# Patient Record
Sex: MALE | Race: BLACK OR AFRICAN AMERICAN | NOT HISPANIC OR LATINO | Employment: FULL TIME | RURAL
[De-identification: names, ages, dates, MRNs, and addresses within clinical notes are randomized per-mention and may not be internally consistent; named-entity substitution may affect disease eponyms.]

---

## 2017-03-02 RX ORDER — LISINOPRIL 40 MG/1
40 TABLET ORAL DAILY
Qty: 30 TABLET | Refills: 11 | Status: SHIPPED | OUTPATIENT
Start: 2017-03-02 | End: 2018-03-27 | Stop reason: SDUPTHER

## 2017-03-06 ENCOUNTER — OFFICE VISIT (OUTPATIENT)
Dept: FAMILY MEDICINE CLINIC | Facility: CLINIC | Age: 57
End: 2017-03-06

## 2017-03-06 VITALS
HEIGHT: 66 IN | SYSTOLIC BLOOD PRESSURE: 122 MMHG | WEIGHT: 177.8 LBS | OXYGEN SATURATION: 99 % | DIASTOLIC BLOOD PRESSURE: 86 MMHG | BODY MASS INDEX: 28.57 KG/M2 | TEMPERATURE: 97.2 F | HEART RATE: 89 BPM

## 2017-03-06 DIAGNOSIS — M25.511 RIGHT SHOULDER PAIN, UNSPECIFIED CHRONICITY: Primary | ICD-10-CM

## 2017-03-06 DIAGNOSIS — E78.5 HYPERLIPIDEMIA, UNSPECIFIED HYPERLIPIDEMIA TYPE: ICD-10-CM

## 2017-03-06 DIAGNOSIS — I10 ESSENTIAL HYPERTENSION: ICD-10-CM

## 2017-03-06 PROCEDURE — 99213 OFFICE O/P EST LOW 20 MIN: CPT | Performed by: FAMILY MEDICINE

## 2017-03-06 NOTE — PROGRESS NOTES
Subjective   Amos Miranda is a 57 y.o. male.     Problem List  1. Essential Hypertension  2. Hyperlipidemia  3. Erectile Dysfunction  4. Right shoulder pain    Pt is 56 yo AAM with the above medical issues. States he is doing well except for right shoulder pain for past month. States it hurts to ambulate shoulder at times or lift right arm above head. Mainly a dull aching pain.Does often lift heavy objects at work  Denies any trauma.  Pain is about 4/ 10 on severity.  Has not tried OTC medication or PT/OT yet.    Pt also is due for labwork.  Currently on aspirin and lipitor for hyperlipidemia. Needs recheck on lipid panel. Is tolerating medication well.  BP at goal today.  Declines influenza vaccination today     Shoulder Injury    The incident occurred at work. The right shoulder is affected. The incident occurred more than 1 week ago. There was no injury mechanism. The quality of the pain is described as aching. The pain does not radiate. The pain is at a severity of 4/10. The pain is mild. Associated symptoms include numbness and tingling. Pertinent negatives include no chest pain or muscle weakness. The symptoms are aggravated by movement, overhead lifting and palpation. He has tried nothing for the symptoms. The treatment provided no relief.        The following portions of the patient's history were reviewed and updated as appropriate: allergies, current medications, past family history, past medical history, past social history, past surgical history and problem list.    Review of Systems   Constitutional: Negative.    HENT: Negative.    Eyes: Negative.    Respiratory: Negative.    Cardiovascular: Negative for chest pain.   Gastrointestinal: Negative.    Endocrine: Negative.    Genitourinary: Negative.    Musculoskeletal: Positive for arthralgias.        Right shoulder pain    Skin: Negative.    Allergic/Immunologic: Negative.    Neurological: Positive for tingling and numbness.   Hematological:  "Negative.    Psychiatric/Behavioral: Negative.        Objective    Visit Vitals   • /86 (BP Location: Right arm, Patient Position: Sitting, Cuff Size: Adult)   • Pulse 89   • Temp 97.2 °F (36.2 °C)   • Ht 66\" (167.6 cm)   • Wt 177 lb 12.8 oz (80.6 kg)   • SpO2 99%   • BMI 28.7 kg/m2           Chemistry        Component Value Date/Time     09/23/2016 1023    K 4.3 09/23/2016 1023     09/23/2016 1023    CO2 30 09/23/2016 1023    BUN 19 09/23/2016 1023    CREATININE 1.3 09/23/2016 1023        Component Value Date/Time    CALCIUM 8.9 09/23/2016 1023    ALKPHOS 51 09/23/2016 1023    AST 34 09/23/2016 1023    ALT 39 09/23/2016 1023    BILITOT 0.7 09/23/2016 1023        Lab Results   Component Value Date    WBC 8.8 09/23/2016    HGB 13.6 (L) 09/23/2016    HCT 38.8 (L) 09/23/2016    MCV 86.2 09/23/2016     09/23/2016     No results found for: CHOL  Lab Results   Component Value Date    TRIG 66 02/23/2016     Lab Results   Component Value Date    HDL 49 (L) 02/23/2016     Lab Results   Component Value Date    LDLCALC 73 02/23/2016     No results found for: LDL  No results found for: HDLLDLRATIO  No components found for: CHOLHDL  Lab Results   Component Value Date    HGBA1C 5.5 09/23/2016     No results found for: TSH, R8XYQKY, K0VSUUA, THYROIDAB  Physical Exam   Constitutional: He is oriented to person, place, and time. He appears well-developed and well-nourished. No distress.   HENT:   Head: Normocephalic and atraumatic.   Right Ear: External ear normal.   Left Ear: External ear normal.   Eyes: Conjunctivae and EOM are normal. Pupils are equal, round, and reactive to light. Right eye exhibits no discharge. Left eye exhibits no discharge. No scleral icterus.   Neck: Normal range of motion. Neck supple. No JVD present. No tracheal deviation present. No thyromegaly present.   Cardiovascular: Normal rate, regular rhythm and normal heart sounds.    Pulmonary/Chest: Effort normal and breath sounds " normal. No stridor. No respiratory distress. He has no wheezes.   Abdominal: Soft. Bowel sounds are normal. He exhibits no distension and no mass. There is no tenderness. There is no rebound and no guarding. No hernia.   Musculoskeletal: He exhibits tenderness. He exhibits no edema or deformity.        Right shoulder: He exhibits decreased range of motion, tenderness, bony tenderness, pain and spasm.        Arms:  Lymphadenopathy:     He has no cervical adenopathy.   Neurological: He is alert and oriented to person, place, and time. He has normal reflexes. No cranial nerve deficit. Coordination normal.   Skin: Skin is warm and dry. No rash noted. He is not diaphoretic. No erythema. No pallor.   Psychiatric: He has a normal mood and affect. His behavior is normal. Judgment and thought content normal.   Nursing note and vitals reviewed.      Assessment/Plan   Problems Addressed this Visit        Cardiovascular and Mediastinum    Hyperlipidemia    Relevant Orders    CBC Auto Differential    Comprehensive Metabolic Panel    Lipid Panel    Essential hypertension       Nervous and Auditory    Right shoulder pain - Primary    Relevant Orders    XR Shoulder 2+ View Right    Ambulatory Referral to Physical Therapy      -hypertension - BP at goal today continue with lisinopril and HCTZ  -hyperlpidemia - recheck lipid panel. Continue with statin  -Right shoulder pain - will get x-ray of shoulder.  Continue with flexeril that was previously prescribed. Will try Voltaren gel 1% to be applied TID. Will refer to PT/OT.  Services appreciated. If not getting better consider MRI  - recheck in 1 month

## 2017-03-06 NOTE — PATIENT INSTRUCTIONS
Diclofenac skin gel  What is this medicine?  DICLOFENAC (dye NICANOR rodgers ak) is a non-steroidal anti-inflammatory drug (NSAID). The 1% skin gel is used to treat osteoarthritis of the hands or knees. The 3% skin gel is used to treat actinic keratosis.  This medicine may be used for other purposes; ask your health care provider or pharmacist if you have questions.  COMMON BRAND NAME(S): DSG Evans, Solaraze, Voltaren Gel  What should I tell my health care provider before I take this medicine?  They need to know if you have any of these conditions:  -asthma  -bleeding problems  -coronary artery bypass graft (CABG) surgery within the past 2 weeks  -heart disease  -high blood pressure  -if you frequently drink alcohol containing drinks  -kidney disease  -liver disease  -open or infected skin  -stomach problems  -an unusual or allergic reaction to diclofenac, aspirin, other NSAIDs, other medicines, benzyl alcohol (3% gel only), foods, dyes, or preservatives  -pregnant or trying to get pregnant  -breast-feeding  How should I use this medicine?  This medicine is for external use only. Follow the directions on the prescription label. Wash hands before and after use. Do not get this medicine in your eyes. If you do, rinse out with plenty of cool tap water. Use your doses at regular intervals. Do not use your medicine more often than directed.  A special MedGuide will be given to you by the pharmacist with each prescription and refill of the 1% gel. Be sure to read this information carefully each time.  Talk to your pediatrician regarding the use of this medicine in children. Special care may be needed. The 3% gel is not approved for use in children.  Overdosage: If you think you have taken too much of this medicine contact a poison control center or emergency room at once.  NOTE: This medicine is only for you. Do not share this medicine with others.  What if I miss a dose?  If you miss a dose, use it as soon as you can. If it is  almost time for your next dose, use only that dose. Do not use double or extra doses.  What may interact with this medicine?  -aspirin  -NSAIDs, medicines for pain and inflammation, like ibuprofen or naproxen  Do not use any other skin products without telling your doctor or health care professional.  This list may not describe all possible interactions. Give your health care provider a list of all the medicines, herbs, non-prescription drugs, or dietary supplements you use. Also tell them if you smoke, drink alcohol, or use illegal drugs. Some items may interact with your medicine.  What should I watch for while using this medicine?  Tell your doctor or healthcare professional if your symptoms do not start to get better or if they get worse. You will need to follow up with your health care provider to monitor your progress. You may need to be treated for up to 3 months if you are using the 3% gel, but the full effect may not occur until 1 month after stopping treatment. If you develop a severe skin reaction, contact your doctor or health care professional immediately.  This medicine can make you more sensitive to the sun. Keep out of the sun. If you cannot avoid being in the sun, wear protective clothing and use sunscreen. Do not use sun lamps or tanning beds/booths.  Do not take medicines such as ibuprofen and naproxen with this medicine. Side effects such as stomach upset, nausea, or ulcers may be more likely to occur. Many medicines available without a prescription should not be taken with this medicine.  This medicine does not prevent heart attack or stroke. In fact, this medicine may increase the chance of a heart attack or stroke. The chance may increase with longer use of this medicine and in people who have heart disease. If you take aspirin to prevent heart attack or stroke, talk with your doctor or health care professional.  This medicine can cause ulcers and bleeding in the stomach and intestines at any  time during treatment. Do not smoke cigarettes or drink alcohol. These increase irritation to your stomach and can make it more susceptible to damage from this medicine. Ulcers and bleeding can happen without warning symptoms and can cause death.  You may get drowsy or dizzy. Do not drive, use machinery, or do anything that needs mental alertness until you know how this medicine affects you. Do not stand or sit up quickly, especially if you are an older patient. This reduces the risk of dizzy or fainting spells.  This medicine can cause you to bleed more easily. Try to avoid damage to your teeth and gums when you brush or floss your teeth.  What side effects may I notice from receiving this medicine?  Side effects that you should report to your doctor or health care professional as soon as possible:  -allergic reactions like skin rash, itching or hives, swelling of the face, lips, or tongue  -black or bloody stools, blood in the urine or vomit  -blurred vision  -chest pain  -difficulty breathing or wheezing  -nausea or vomiting  -redness, blistering, peeling or loosening of the skin, including inside the mouth  -slurred speech or weakness on one side of the body  -trouble passing urine or change in the amount of urine  -unexplained weight gain or swelling  -unusually weak or tired  -yellowing of eyes or skin  Side effects that usually do not require medical attention (report to your doctor or health care professional if they continue or are bothersome):  -dizziness  -dry skin  -headache  -heartburn  -increased sensitivity to the sun  -stomach pain  -tingling at the application site  This list may not describe all possible side effects. Call your doctor for medical advice about side effects. You may report side effects to FDA at 1-081-FDA-6779.  Where should I keep my medicine?  Keep out of the reach of children.  Store the 1% gel at room temperature between 15 and 30 degrees C (59 and 86 degrees F). Store the 3% gel  at room temperature between 20 and 25 degrees C (68 and 77 degrees F). Protect from light. Throw away any unused medicine after the expiration date.  NOTE: This sheet is a summary. It may not cover all possible information. If you have questions about this medicine, talk to your doctor, pharmacist, or health care provider.     © 2016, [employer]. (2009-04-20 16:35:07)

## 2017-03-23 ENCOUNTER — HOSPITAL ENCOUNTER (OUTPATIENT)
Dept: PHYSICAL THERAPY | Facility: HOSPITAL | Age: 57
Setting detail: THERAPIES SERIES
Discharge: HOME | End: 2017-03-23

## 2017-03-23 DIAGNOSIS — M25.511 RIGHT SHOULDER PAIN, UNSPECIFIED CHRONICITY: Primary | ICD-10-CM

## 2017-03-23 PROCEDURE — 97162 PT EVAL MOD COMPLEX 30 MIN: CPT | Performed by: PHYSICAL THERAPIST

## 2017-03-23 PROCEDURE — 97110 THERAPEUTIC EXERCISES: CPT | Performed by: PHYSICAL THERAPIST

## 2017-03-23 NOTE — PROGRESS NOTES
Outpatient Physical Therapy Ortho Initial Evaluation  [facility]  Lakeisha Kuhn, PT, DPT, CSCS       Patient Name: Amos Miranda  : 1960  MRN: [id number]  Today's Date: 3/23/2017      Visit Date: 2017     Pt reports 1/10 pain.  Reports N/A% of improvement.  Attended  visits.  Insurance available: 20 visits per calendar year  Next MD appt: [id number]  Recertification: 2017.    Patient Active Problem List   Diagnosis   • Need for hepatitis C screening test   • Hyperlipidemia   • Essential hypertension   • Low back pain without sciatica   • Right shoulder pain        Past Medical History:   Diagnosis Date   • Dyslipidemia    • Dysuria    • Encounter for screening for diabetes mellitus    • Encounter for screening for lipoid disorders    • Encounter for screening for malignant neoplasm of colon    • Essential hypertension    • Generalized aches and pains    • Headache    • Lateral epicondylitis     R>L   • Male erectile dysfunction, unspecifed    • Migraine    • Nocturia         History reviewed. No pertinent surgical history.     Number of days off work: None    Patient is     Patient has grown children.    Medications:  Lisinipril  Hydrochlorothiazide  Atrovastatin  Cialis  Flexaril    Allergies: See EMR    Visit Dx:     ICD-10-CM ICD-9-CM   1. Right shoulder pain, unspecified chronicity M25.511 719.41             Patient History       17 1600          History    Chief Complaint Pain  -AJ      Type of Pain Shoulder pain  -AJ      Date Current Problem(s) Began --   Few month onset of shoulder pain.  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 Reports on the weekends it does pretty good. DUring the week it is mainly a deep ache. Denies and numbness or tingling.  -AJ      Patient/Caregiver Goals Relieve pain  -AJ      Current Tobacco Use None.   "-AJ      Smoking Status Non-smoker  -AJ      Patient's Rating of General Health Good  -AJ      Hand Dominance right-handed  -AJ      Occupation/sports/leisure activities Occupation: [employer], Dye changer; Hobbies: sports, gardening, lawn care  -AJ      Patient seeing anyone else for problem(s)? No  -AJ      What clinical tests have you had for this problem? X-ray  -AJ      Results of Clinical Tests Calcific tendinitis or bursitis, report in chart.  -AJ      History of Previous Related Injuries None.  -AJ      Pain     Pain Location Shoulder  -AJ      Pain at Present 1  -AJ      Pain at Best 0  -AJ      Pain at Worst 4  -AJ      Pain Frequency Intermittent;Several days a week  -AJ      Pain Description Aching  -AJ      What Performance Factors Make the Current Problem(s) WORSE? Lifting right in front of the body.  -AJ      What Performance Factors Make the Current Problem(s) BETTER? Rest  -AJ      Is your sleep disturbed? No  -AJ      What position do you sleep in? Right sidelying;Left sidelying  -AJ      Difficulties at work? Lifting dye casts.  -AJ      Difficulties with ADL's? None.  -AJ      Difficulties with recreational activities? Gardening, sports  -AJ        User Key  (r) = Recorded By, (t) = Taken By, (c) = Cosigned By    Initials Name Provider Type    AJ Lakeisha Kuhn, PT Physical Therapist                PT Ortho       03/23/17 1600    Subjective Comments    Subjective Comments Patient wishes to relieve pain completely and find out why it hurts.  -AJ    Subjective Pain    Able to rate subjective pain? yes  -AJ    Pre-Treatment Pain Level 1  -AJ    Post-Treatment Pain Level 0   \"Numb\"  -AJ    Posture/Observations    Posture- WNL Posture is WNL  -AJ    Alignment Options Rounded shoulders  -AJ    Rounded Shoulders Bilateral:;Mild;Increased  -AJ    Posture/Observations Comments No acute distress, good postural awareness.  -AJ    Sensation    Sensation WNL? WNL  -AJ    Light Touch No apparent deficits  -AJ "    Sharp/Dull No apparent deficits  -AJ    Additional Comments Denies and numbness or tingling  -AJ    Special Tests/Palpation    Special Tests/Palpation --   No areas of TTP.  -AJ    Shoulder Impingement/Rotator Cuff Special Tests    Bazzi-Timothy Test (RC Lesion vs. Bursitis) Bilateral:;Negative  -AJ    Neer Impingement Test (RC Lesion vs. Bursitis) Bilateral:;Negative  -AJ    Full Can Test (RC Lesion) Bilateral:;Negative  -AJ    Empty Can Test (RC Lesion) Bilateral:;Negative   painful on R, but negative test.  -AJ    Drop Arm Test (Full Thickness RC Lesion) Bilateral:;Negative  -AJ    Internal Impingement Sign Bilateral:;Negative  -AJ    Lift-Off Test (Subscapularis Lesion) Bilateral:;Negative  -AJ    Belly Press (Subscapularis Lesion) Bilateral:;Negative  -AJ    Speed's Test (LH of Biceps Lesion) Bilateral:;Negative  -AJ    Yergason Test (LH of Biceps Lesion) Bilateral:;Negative  -AJ    Shoulder Laxity/Instability Special Tests    Load and Shift Test Bilateral:;Negative  -AJ    Sulcus Sign, 0 Degrees Bilateral:;Negative  -AJ    Horizontal Adduction Test (AC Joint Pain) Bilateral:;Negative   (Negative AC squeeze B)  -AJ    Biceps/Labral Special Tests    Brandon's Test (Labral Test) Bilateral:;Negative  -AJ    Speed's Test (Biceps Tendinopathy vs. Labral Tear) Bilateral:;Negative  -AJ    Yergason's Test (Biceps Tendinopathy vs. Labral Tear) Bilateral:;Negative  -AJ    Left Shoulder    Flexion AROM --   155°  -AJ    ABduction AROM --   156°  -AJ    External Rotation AROM --   87° @90° of shoulder abduction  -AJ    Internal Rotation AROM --   85° @90° of shoulder abduction  -AJ    Right Shoulder    Flexion AROM --   155°  -AJ    ABduction AROM --   157°  -AJ    External Rotation AROM --   90° @90° of shoulder abduction  -AJ    Internal Rotation AROM --   62° @90° of shoulder abduction  -AJ    Left Shoulder    Flexion Gross Movement (5/5) normal  -AJ    ABduction Gross Movement (5/5) normal  -AJ    Abduction  Supraspinatus (4+/5) good plus  -AJ    Int Rotation Gross Movement (5/5) normal  -AJ    Ext Rotation Gross Movement (5/5) normal  -AJ    External Rotation Infraspinatus (4+/5) good plus  -AJ    Right Shoulder    Flexion Gross Movement (5/5) normal  -AJ    ABduction Gross Movement (5/5) normal  -AJ    Abduction Supraspinatus (4+/5) good plus  -AJ    Int Rotation Gross Movement (5/5) normal  -AJ    Ext Rotation Gross Movement (5/5) normal  -AJ    External Rotation Infraspinatus (4/5) good  -AJ    Flexibility    Flexibility Tested? --   Mild Pec tightness B  -AJ    Transfers    Transfer, Comment I with all transfers  -      User Key  (r) = Recorded By, (t) = Taken By, (c) = Cosigned By    Initials Name Provider Type    RADHA Kuhn, PT Physical Therapist                Therapy Education       03/23/17 1600          Therapy Education    Given HEP;Pain management;Posture/body mechanics  -      Program New  -AJ      How Provided Verbal;Demonstration;Written  -AJ      Provided to Patient  -      Level of Understanding Verbalized  -        User Key  (r) = Recorded By, (t) = Taken By, (c) = Cosigned By    Initials Name Provider Type    RADHA Kuhn, PT Physical Therapist                PT OP Goals       03/23/17 1600       PT Short Term Goals    STG Date to Achieve 04/13/17  -     STG 1 I with HEP and have additions/changes by next recertification.  -     STG 2 AROM R shoulder IR to 70° at 90° of shoulder abduction.  -     STG 3 Infraspinatus strength 4+/5 or greater on R.  -     STG 4 patient to be more aware of posture and posture correction technique.  -     STG 5 R supraspinatus strength 5/5.  -     Long Term Goals    LTG Date to Achieve 04/20/17  -     LTG 1 AROM R shoulder IR 80° or greater at 90° of shoulder abduction.  -     LTG 2 B UE 5/5 in all directions.  -     LTG 3 Able to show proper lifitng technique floor to waist with 40# x5 proper technique, work simulation  no increrase in pain.  -     LTG 4 I with final HEP.  -     LTG 5 D/C with free 30 day fitness formula membership.  -     Time Calculation    PT Goal Re-Cert Due Date 04/13/17  -       User Key  (r) = Recorded By, (t) = Taken By, (c) = Cosigned By    Initials Name Provider Type    RADHA Kuhn, PT Physical Therapist        Barriers to Rehab: Include significant or possible significant arthritic or degenerative changes that have occurred within the joint.      Safety Issues: None noted.          PT Assessment/Plan       03/23/17 1600       PT Assessment    Functional Limitations Limitation in home management;Limitations in community activities;Performance in leisure activities;Performance in work activities  -     Impairments Impaired flexibility;Impaired muscle endurance;Impaired muscle length;Impaired muscle power;Motor function;Muscle strength;Pain;Posture;Range of motion  -     Assessment Comments patient did well with all ther ex and given written copies of HEP exercises.  -     Rehab Potential Excellent  -     Patient/caregiver participated in establishment of treatment plan and goals Yes  -     Patient would benefit from skilled therapy intervention Yes  -     PT Plan    PT Frequency 2x/week  -     Predicted Duration of Therapy Intervention (days/wks) 3-4 weeks, 6-8 visits.  -     Planned CPT's? PT EVAL MOD COMPLELITY: 08535;PT THER PROC EA 15 MIN: 62948;PT THER ACT EA 15 MIN: 12757;PT MANUAL THERAPY EA 15 MIN: 86702;PT NEUROMUSC RE-EDUCATION EA 15 MIN: 56441;PT ELECTRICAL STIM UNATTEND: ;PT THER SUPP EA 15 MIN  -     Physical Therapy Interventions (Optional Details) dry needling;gross motor skills;home exercise program;joint mobilization;manual therapy techniques;modalities;neuromuscular re-education;postural re-education;ROM (Range of Motion);strengthening;stretching  -     PT Plan Comments RTC strength and scapular stabalization  -       User Key  (r) = Recorded  "By, (t) = Taken By, (c) = Cosigned By    Initials Name Provider Type    RADHA Kuhn, KYLAH Physical Therapist        Other therapeutic activities and/or exercises will be prescribed depending on the patients progress or lack there of.          Modalities       03/23/17 1600          Ice    Ice Applied Yes  -AJ      Location R shoulder  -AJ      Rx Minutes 15 mins  -AJ      Ice S/P Rx Yes  -AJ        User Key  (r) = Recorded By, (t) = Taken By, (c) = Cosigned By    Initials Name Provider Type    RADHA Kuhn PT Physical Therapist              Exercises       03/23/17 1600          Subjective Comments    Subjective Comments Patient wishes to relieve pain completely and find out why it hurts.  -AJ      Subjective Pain    Able to rate subjective pain? yes  -AJ      Pre-Treatment Pain Level 1  -AJ      Post-Treatment Pain Level 0   \"Numb\"  -AJ      Exercise 1    Exercise Name 1 Pro II UE F/R  -AJ      Resistance 1 --   Level 4.0  -AJ      Time (Minutes) 1 10  -AJ      Exercise 2    Exercise Name 2 Doorway S  -AJ      Sets 2 2  -AJ      Time (Seconds) 2 30  -AJ      Exercise 3    Exercise Name 3 Full cans 3-way, >90°  -AJ      Reps 3 10   each  -AJ      Exercise 4    Exercise Name 4 T-band No money  -AJ      Equipment 4 Theraband  -AJ      Resistance 4 Red  -AJ      Reps 4 20  -AJ      Time (Seconds) 4 5\" hold  -AJ      Exercise 5    Exercise Name 5 Chest/Bicep S  -AJ      Reps 5 2  -AJ      Time (Seconds) 5 30  -AJ        User Key  (r) = Recorded By, (t) = Taken By, (c) = Cosigned By    Initials Name Provider Type    RADHA Kuhn PT Physical Therapist                              Outcome Measures       03/23/17 1600          Quick DASH    Open a tight or new jar. 1  -AJ      Do heavy household chores (e.g., wash walls, wash floors) 1  -AJ      Carry a shopping bag or briefcase 1  -AJ      Wash your back 1  -AJ      Use a knife to cut food 1  -AJ      Recreational activities in which you " take some force or impact through your arm, should or hand (e.g. golf, hammering, tennis, etc.) 1  -AJ      During the past week, to what extent has your arm, shoulder, or hand problem interfered with your normal social activites with family, friends, neighbors or groups? 2  -AJ      During the past week, were you limited in your work or other regular daily activities as a result of your arm, shoulder or hand problem? 1  -AJ      Arm, Shoulder, or hand pain 2  -AJ      Tingling (pins and needles) in your arm, shoulder, or hand 1  -AJ      During the past week, how much difficulty have you had sleeping because of the pain in your arm, shoulder or hand? 1  -AJ      Number of Questions Answered 11  -AJ      Quick DASH Score 4.55  -AJ      Functional Assessment    Outcome Measure Options Quick DASH  -AJ        User Key  (r) = Recorded By, (t) = Taken By, (c) = Cosigned By    Initials Name Provider Type    RADHA Kuhn PT Physical Therapist            Time Calculation:   Start Time: 1600  Stop Time: 1705  Time Calculation (min): 65 min  PT Non-Billable Time (min): 20 min  Total Timed Code Minutes- PT: 25 minute(s)     Therapy Charges for Today     Code Description Service Date Service Provider Modifiers Qty    09239960312 HC PT EVAL MOD COMPLEXITY 2 3/23/2017 Lakeisha Kuhn, PT GP 1    18551594082 HC PT THER PROC EA 15 MIN 3/23/2017 Lakeisha Kuhn PT GP 2    05056565372 HC PT THER SUPP EA 15 MIN 3/23/2017 Lakeisha Kuhn PT GP 1          PT G-Codes  Outcome Measure Options: Quick DASH         Lakeisha Kuhn PT, DPT, CSCS  3/23/2017

## 2017-04-03 ENCOUNTER — TELEPHONE (OUTPATIENT)
Dept: PHYSICAL THERAPY | Facility: HOSPITAL | Age: 57
End: 2017-04-03

## 2017-04-05 ENCOUNTER — HOSPITAL ENCOUNTER (OUTPATIENT)
Dept: PHYSICAL THERAPY | Facility: HOSPITAL | Age: 57
Setting detail: THERAPIES SERIES
Discharge: HOME | End: 2017-04-05

## 2017-04-05 DIAGNOSIS — M25.511 RIGHT SHOULDER PAIN, UNSPECIFIED CHRONICITY: Primary | ICD-10-CM

## 2017-04-05 LAB
ALBUMIN SERPL-MCNC: 4.7 G/DL (ref 3.4–4.8)
ALBUMIN/GLOB SERPL: 1.3 G/DL (ref 1.1–1.8)
ALP SERPL-CCNC: 68 U/L (ref 38–126)
ALT SERPL W P-5'-P-CCNC: 38 U/L (ref 21–72)
ANION GAP SERPL CALCULATED.3IONS-SCNC: 15 MMOL/L (ref 5–15)
ARTICHOKE IGE QN: 69 MG/DL (ref 1–129)
AST SERPL-CCNC: 29 U/L (ref 17–59)
BASOPHILS # BLD AUTO: 0.02 10*3/MM3 (ref 0–0.2)
BASOPHILS NFR BLD AUTO: 0.1 % (ref 0–2)
BILIRUB SERPL-MCNC: 1.2 MG/DL (ref 0.2–1.3)
BUN BLD-MCNC: 13 MG/DL (ref 7–21)
BUN/CREAT SERPL: 9.2 (ref 7–25)
CALCIUM SPEC-SCNC: 9.7 MG/DL (ref 8.4–10.2)
CHLORIDE SERPL-SCNC: 100 MMOL/L (ref 95–110)
CHOLEST SERPL-MCNC: 144 MG/DL (ref 0–199)
CO2 SERPL-SCNC: 28 MMOL/L (ref 22–31)
CREAT BLD-MCNC: 1.42 MG/DL (ref 0.7–1.3)
DEPRECATED RDW RBC AUTO: 45.1 FL (ref 35.1–43.9)
EOSINOPHIL # BLD AUTO: 0.02 10*3/MM3 (ref 0–0.7)
EOSINOPHIL NFR BLD AUTO: 0.1 % (ref 0–7)
ERYTHROCYTE [DISTWIDTH] IN BLOOD BY AUTOMATED COUNT: 14.2 % (ref 11.5–14.5)
GFR SERPL CREATININE-BSD FRML MDRD: 62 ML/MIN/1.73 (ref 56–130)
GLOBULIN UR ELPH-MCNC: 3.7 GM/DL (ref 2.3–3.5)
GLUCOSE BLD-MCNC: 104 MG/DL (ref 60–100)
HCT VFR BLD AUTO: 43.2 % (ref 39–49)
HDLC SERPL-MCNC: 53 MG/DL (ref 60–200)
HGB BLD-MCNC: 15.2 G/DL (ref 13.7–17.3)
IMM GRANULOCYTES # BLD: 0.08 10*3/MM3 (ref 0–0.02)
IMM GRANULOCYTES NFR BLD: 0.3 % (ref 0–0.5)
LDLC/HDLC SERPL: 1.53 {RATIO} (ref 0–3.55)
LYMPHOCYTES # BLD AUTO: 1.59 10*3/MM3 (ref 0.6–4.2)
LYMPHOCYTES NFR BLD AUTO: 6.9 % (ref 10–50)
MCH RBC QN AUTO: 30.6 PG (ref 26.5–34)
MCHC RBC AUTO-ENTMCNC: 35.2 G/DL (ref 31.5–36.3)
MCV RBC AUTO: 87.1 FL (ref 80–98)
MONOCYTES # BLD AUTO: 1.41 10*3/MM3 (ref 0–0.9)
MONOCYTES NFR BLD AUTO: 6.1 % (ref 0–12)
NEUTROPHILS # BLD AUTO: 20.09 10*3/MM3 (ref 2–8.6)
NEUTROPHILS NFR BLD AUTO: 86.5 % (ref 37–80)
PLATELET # BLD AUTO: 207 10*3/MM3 (ref 150–450)
PMV BLD AUTO: 11.2 FL (ref 8–12)
POTASSIUM BLD-SCNC: 4.5 MMOL/L (ref 3.5–5.1)
PROT SERPL-MCNC: 8.4 G/DL (ref 6.3–8.6)
RBC # BLD AUTO: 4.96 10*6/MM3 (ref 4.37–5.74)
SODIUM BLD-SCNC: 143 MMOL/L (ref 137–145)
TRIGL SERPL-MCNC: 49 MG/DL (ref 20–199)
WBC NRBC COR # BLD: 23.21 10*3/MM3 (ref 3.2–9.8)

## 2017-04-05 PROCEDURE — 97110 THERAPEUTIC EXERCISES: CPT

## 2017-04-05 PROCEDURE — 85025 COMPLETE CBC W/AUTO DIFF WBC: CPT | Performed by: FAMILY MEDICINE

## 2017-04-05 PROCEDURE — 80053 COMPREHEN METABOLIC PANEL: CPT | Performed by: FAMILY MEDICINE

## 2017-04-05 PROCEDURE — 80061 LIPID PANEL: CPT | Performed by: FAMILY MEDICINE

## 2017-04-05 NOTE — PROGRESS NOTES
Outpatient Physical Therapy Ortho Treatment Note  [facility]  Sherice Tapia PTA  17  4:15 PM       Patient Name: Amos Miranda  : 1960  MRN: [id number]  Today's Date: 2017      Visit Date: 2017  Subjective Improvement: N/A      Attendance: 2/3  Approved: 20 visits           MD follow up: 17            date: 17        Visit Dx:    ICD-10-CM ICD-9-CM   1. Right shoulder pain, unspecified chronicity M25.511 719.41       Patient Active Problem List   Diagnosis   • Need for hepatitis C screening test   • Hyperlipidemia   • Essential hypertension   • Low back pain without sciatica   • Right shoulder pain        Past Medical History:   Diagnosis Date   • Dyslipidemia    • Dysuria    • Encounter for screening for diabetes mellitus    • Encounter for screening for lipoid disorders    • Encounter for screening for malignant neoplasm of colon    • Essential hypertension    • Generalized aches and pains    • Headache    • Lateral epicondylitis     R>L   • Male erectile dysfunction, unspecifed    • Migraine    • Nocturia         No past surgical history on file.          PT Ortho       17 1500    Subjective Comments    Subjective Comments pt reports he is sore today from doing some yard work.  -ESTIVEN    Subjective Pain    Able to rate subjective pain? yes  -ESTIVEN    Pre-Treatment Pain Level 7  -ESTIVEN    Post-Treatment Pain Level 4  -ESTIVEN    Right Shoulder    Flexion AROM --   158°  -ESTIVEN    ABduction AROM --   157°  -ESTIVEN      User Key  (r) = Recorded By, (t) = Taken By, (c) = Cosigned By    Initials Name Provider Type    ESTIVEN Tapia PTA Physical Therapy Assistant                            PT Assessment/Plan       17 1500       PT Assessment    Assessment Comments slight increase in AROM FF, abd did not change. Good form with all.   -ESTIVEN     PT Plan    PT Frequency 2x/week  -ESTIVEN     Predicted Duration of Therapy Intervention (days/wks) 3-4 weeks,  "6-8 visits  -ESTIVEN     PT Plan Comments Clocks  -ESTIVEN       User Key  (r) = Recorded By, (t) = Taken By, (c) = Cosigned By    Initials Name Provider Type    ESTIVEN Tapia PTA Physical Therapy Assistant                Modalities       04/05/17 1500          Ice    Ice Applied Yes  -ESTIVEN      Location R shoulder  -ESTIVEN      Rx Minutes 15 mins  -ESTIVEN      Ice S/P Rx Yes  -ESTIVEN        User Key  (r) = Recorded By, (t) = Taken By, (c) = Cosigned By    Initials Name Provider Type    ESTIVEN Tapia PTA Physical Therapy Assistant                Exercises       04/05/17 1500          Subjective Comments    Subjective Comments pt reports he is sore today from doing some yard work.  -ESTIVEN      Subjective Pain    Able to rate subjective pain? yes  -ESTIVEN      Pre-Treatment Pain Level 7  -ESTIVEN      Post-Treatment Pain Level 4  -ESTIVEN      Exercise 1    Exercise Name 1 Pro II UE F/R  -ESTIVEN      Resistance 1 --   Level 4.0  -ESTIVEN      Time (Minutes) 1 10  -ESTIVEN      Exercise 2    Exercise Name 2 Doorway S  -ESTIVEN      Sets 2 3  -ESTIVEN      Time (Seconds) 2 30  -ESTIVEN      Exercise 3    Exercise Name 3 Full cans 3-way, >90°  -ESTIVEN      Reps 3 10   each  -ESTIVEN      Exercise 4    Exercise Name 4 T-band No money  -ESTIVEN      Equipment 4 Theraband  -ESTIVEN      Resistance 4 Red  -ESTIVEN      Reps 4 20  -ESTIVEN      Time (Seconds) 4 5\" hold  -ESTIVEN      Exercise 5    Exercise Name 5 Chest/Bicep S  -ESTIVEN      Reps 5 2  -ESTIVEN      Time (Seconds) 5 30  -ESTIVEN      Exercise 6    Exercise Name 6 Tband: Mid rows  -ESTIVEN      Equipment 6 Theraband  -ESTIVEN      Resistance 6 Red  -ESTIVEN      Sets 6 2  -ESTIVEN      Reps 6 10  -ESTIVEN      Exercise 7    Exercise Name 7 Tband: shoulder extension  -ESTIVEN      Equipment 7 Theraband  -ESTIVEN      Resistance 7 Red  -ESTIVEN      Sets 7 2  -ESTIVEN      Reps 7 10  -ESTIVEN      Exercise 8    Exercise Name 8 Wall push ups  -ESTIVEN      Sets 8 2  -ESTIVEN      Reps 8 10  -ESTIVEN      Exercise 9    Exercise Name 9 Chest pulls  -ESTIVEN      Equipment 9 Theraband  -ESTIVEN      Resistance 9 Red  -ESTIVEN      Sets 9 2  -ESTIVEN "      Reps 9 10  -ESTIVEN        User Key  (r) = Recorded By, (t) = Taken By, (c) = Cosigned By    Initials Name Provider Type    ESTIVEN Tapia PTA Physical Therapy Assistant                               PT OP Goals       04/05/17 1600       PT Short Term Goals    STG Date to Achieve 04/13/17  -ESTIVEN     STG 1 I with HEP and have additions/changes by next recertification.  -ESTIVEN     STG 1 Progress Ongoing  -ESTIVEN     STG 2 AROM R shoulder IR to 70° at 90° of shoulder abduction.  -ESTIVEN     STG 2 Progress Ongoing  -ESTIVEN     STG 3 Infraspinatus strength 4+/5 or greater on R.  -ESTIVEN     STG 3 Progress Ongoing  -ESTIVEN     STG 4 patient to be more aware of posture and posture correction technique.  -ESTIVEN     STG 4 Progress Ongoing  -ESTIVEN     STG 5 R supraspinatus strength 5/5.  -ESTIVEN     STG 5 Progress Ongoing  -ESTIVEN     Long Term Goals    LTG Date to Achieve 04/20/17  -ESTIVEN     LTG 1 AROM R shoulder IR 80° or greater at 90° of shoulder abduction.  -ESTIVEN     LTG 1 Progress Ongoing  -ESTIVEN     LTG 2 B UE 5/5 in all directions.  -ESTIVEN     LTG 2 Progress Ongoing  -ESTIVEN     LTG 3 Able to show proper lifitng technique floor to waist with 40# x5 proper technique, work simulation no increrase in pain.  -ESTIVEN     LTG 3 Progress Ongoing  -ESTIVEN     LTG 4 I with final HEP.  -ESTIVEN     LTG 4 Progress Ongoing  -ESTIVEN     LTG 5 D/C with free 30 day fitness formula membership.  -ESTIVEN     LTG 5 Progress Ongoing  -ESTIEVN     Time Calculation    PT Goal Re-Cert Due Date 04/13/17  -ESTIVEN       User Key  (r) = Recorded By, (t) = Taken By, (c) = Cosigned By    Initials Name Provider Type    ESTIVEN Tapia PTA Physical Therapy Assistant                Therapy Education       04/05/17 1600          Therapy Education    Given HEP  -ESTIVEN      Program Reinforced  -ESTIVEN      How Provided Verbal;Demonstration;Written  -ESTIVEN      Provided to Patient  -ESTIVEN      Level of Understanding Verbalized  -ESTIVEN        User Key  (r) = Recorded By, (t) = Taken By, (c) = Cosigned By    Initials Name Provider Type     ESTIVEN Tapia PTA Physical Therapy Assistant                Time Calculation:   Start Time: 1515  Stop Time: 1620  Time Calculation (min): 65 min  Total Timed Code Minutes- PT: 50 minute(s)    Therapy Charges for Today     Code Description Service Date Service Provider Modifiers Qty    94162946030 HC PT THER PROC EA 15 MIN 4/5/2017 Sherice Tapia PTA GP 3    08907683585 HC PT THER SUPP EA 15 MIN 4/5/2017 Sherice Tapia PTA GP 1                    Sherice Tapia PTA  4/5/2017

## 2017-04-06 ENCOUNTER — HOSPITAL ENCOUNTER (OUTPATIENT)
Dept: PHYSICAL THERAPY | Facility: HOSPITAL | Age: 57
Setting detail: THERAPIES SERIES
Discharge: HOME | End: 2017-04-06

## 2017-04-06 DIAGNOSIS — M25.511 RIGHT SHOULDER PAIN, UNSPECIFIED CHRONICITY: Primary | ICD-10-CM

## 2017-04-06 PROCEDURE — 97110 THERAPEUTIC EXERCISES: CPT | Performed by: PHYSICAL THERAPY ASSISTANT

## 2017-04-06 NOTE — PROGRESS NOTES
Outpatient Physical Therapy Ortho Treatment Note  [facility]     Patient Name: Amos Miranda  : 1960  MRN: [id number]  Today's Date: 2017      Visit Date: 2017    Visits 3/4   Recert Date 17   MD appointment    Pt reports  0% improvement       Visit Dx:    ICD-10-CM ICD-9-CM   1. Right shoulder pain, unspecified chronicity M25.511 719.41       Patient Active Problem List   Diagnosis   • Need for hepatitis C screening test   • Hyperlipidemia   • Essential hypertension   • Low back pain without sciatica   • Right shoulder pain        Past Medical History:   Diagnosis Date   • Dyslipidemia    • Dysuria    • Encounter for screening for diabetes mellitus    • Encounter for screening for lipoid disorders    • Encounter for screening for malignant neoplasm of colon    • Essential hypertension    • Generalized aches and pains    • Headache    • Lateral epicondylitis     R>L   • Male erectile dysfunction, unspecifed    • Migraine    • Nocturia         No past surgical history on file.          PT Ortho       17 1700    Left Shoulder    Flexion Gross Movement (4+/5) good plus  -    ABduction Gross Movement (4+/5) good plus  -      17 1600    Subjective Pain    Post-Treatment Pain Level 2  -      17 1500    Subjective Comments    Subjective Comments pt reports he is sore today from doing some yard work.  -ESTIVEN    Subjective Pain    Able to rate subjective pain? yes  -ESTIVEN    Pre-Treatment Pain Level 7  -ESTIVEN    Post-Treatment Pain Level 4  -ESTIVEN    Right Shoulder    Flexion AROM --   158°  -ESTIVEN    ABduction AROM --   157°  -ESTIVEN      User Key  (r) = Recorded By, (t) = Taken By, (c) = Cosigned By    Initials Name Provider Type     Jame Abernathy PTA Physical Therapy Assistant    ESTIVEN Tapia PTA Physical Therapy Assistant                            PT Assessment/Plan       17 1600       PT Assessment    Assessment Comments Pt uzma tx well  fatigued post tx but no c/o of increased pain  -JH     PT Plan    PT Frequency 2x/week  -     PT Plan Comments lift station next visit  -       User Key  (r) = Recorded By, (t) = Taken By, (c) = Cosigned By    Initials Name Provider Type    MISSY Abernathy PTA Physical Therapy Assistant                Modalities       04/06/17 1600          Ice    Ice Applied Yes  -      Location R shoulder  -JH      Rx Minutes 15 mins  -JH      Ice S/P Rx Yes  -        User Key  (r) = Recorded By, (t) = Taken By, (c) = Cosigned By    Initials Name Provider Type    MISSY Abernathy PTA Physical Therapy Assistant                Exercises       04/06/17 1600          Subjective Comments    Subjective Comments Pt reports that the more he uses his are and lifts objecs the more pain he has.  -      Subjective Pain    Able to rate subjective pain? yes  -JH      Pre-Treatment Pain Level 5  -JH      Post-Treatment Pain Level 2  -JH      Exercise 1    Exercise Name 1 Pro II UE F/R  -JH      Resistance 1 --   Level 4.0  -JH      Time (Minutes) 1 10  -JH      Exercise 2    Exercise Name 2 Doorway S  -JH      Sets 2 3  -JH      Time (Seconds) 2 30  -JH      Exercise 3    Exercise Name 3 Full cans 3-way, >90°  -JH      Reps 3 10   each  -JH      Exercise 4    Exercise Name 4 supine wand flexion  -JH      Weights/Plates 4 3  -JH      Reps 4 20  -JH      Exercise 5    Exercise Name 5 CP with wand  -JH      Weights/Plates 5 8  -JH      Reps 5 20  -JH      Exercise 6    Exercise Name 6 YTI  -JH      Reps 6 20  -JH      Exercise 7    Exercise Name 7 6 way shoulder tband  -JH      Equipment 7 Theraband  -JH      Resistance 7 Red  -JH      Reps 7 20  -JH      Exercise 8    Exercise Name 8 no money  -JH      Equipment 8 Theraband  -JH      Resistance 8 Red  -JH      Reps 8 20  -JH      Exercise 9    Exercise Name 9 3 way clocks  -JH      Equipment 9 Theraband  -JH      Resistance 9 Red  -JH      Reps 9 20  -JH      Exercise 10    Exercise  Name 10 wall slides fwd, abd  -      Reps 10 20  -      Exercise 11    Exercise Name 11 plynth push up  -      Reps 11 20  -        User Key  (r) = Recorded By, (t) = Taken By, (c) = Cosigned By    Initials Name Provider Type     Jame Abernathy PTA Physical Therapy Assistant                               PT OP Goals       04/06/17 1600       PT Short Term Goals    STG Date to Achieve 04/13/17  -     STG 1 I with HEP and have additions/changes by next recertification.  -     STG 1 Progress Ongoing  -     STG 2 AROM R shoulder IR to 70° at 90° of shoulder abduction.  -     STG 2 Progress Ongoing  -     STG 3 Infraspinatus strength 4+/5 or greater on R.  -     STG 3 Progress Ongoing  -     STG 4 patient to be more aware of posture and posture correction technique.  -     STG 4 Progress Ongoing  -     STG 5 R supraspinatus strength 5/5.  -     STG 5 Progress Ongoing  -     Long Term Goals    LTG Date to Achieve 04/20/17  -     LTG 1 AROM R shoulder IR 80° or greater at 90° of shoulder abduction.  -     LTG 1 Progress Ongoing  -     LTG 2 B UE 5/5 in all directions.  -     LTG 2 Progress Ongoing  -     LTG 3 Able to show proper lifitng technique floor to waist with 40# x5 proper technique, work simulation no increrase in pain.  -     LTG 3 Progress Ongoing  -     LTG 4 I with final HEP.  -     LTG 4 Progress Ongoing  -     LTG 5 D/C with free 30 day fitness formula membership.  -[facility] 5 Progress Ongoing  -     Time Calculation    PT Goal Re-Cert Due Date 04/13/17  -       User Key  (r) = Recorded By, (t) = Taken By, (c) = Cosigned By    Initials Name Provider Type    MISSY Abernathy PTA Physical Therapy Assistant                    Time Calculation:   Start Time: 1607  Stop Time: 1715  Time Calculation (min): 68 min    Therapy Charges for Today     Code Description Service Date Service Provider Modifiers Qty    [id number]  PT THER PROC EA 15 MIN 4/6/2017  Jame Abernathy, IVAN GP 4    [id number]  PT THER SUPP EA 15 MIN 4/6/2017 Jame Abernathy PTA GP 1                    Jame Abernathy PTA  4/6/2017

## 2017-04-07 ENCOUNTER — OFFICE VISIT (OUTPATIENT)
Dept: FAMILY MEDICINE CLINIC | Facility: CLINIC | Age: 57
End: 2017-04-07

## 2017-04-07 VITALS
HEART RATE: 99 BPM | RESPIRATION RATE: 16 BRPM | BODY MASS INDEX: 28.28 KG/M2 | TEMPERATURE: 97.9 F | WEIGHT: 176 LBS | SYSTOLIC BLOOD PRESSURE: 110 MMHG | DIASTOLIC BLOOD PRESSURE: 72 MMHG | HEIGHT: 66 IN

## 2017-04-07 DIAGNOSIS — M25.511 RIGHT SHOULDER PAIN, UNSPECIFIED CHRONICITY: ICD-10-CM

## 2017-04-07 DIAGNOSIS — R05.9 COUGH: ICD-10-CM

## 2017-04-07 DIAGNOSIS — D72.829 LEUKOCYTOSIS, UNSPECIFIED TYPE: Primary | ICD-10-CM

## 2017-04-07 DIAGNOSIS — M75.51 BURSITIS, SHOULDER, RIGHT: ICD-10-CM

## 2017-04-07 DIAGNOSIS — D72.9 NEUTROPHILIA: ICD-10-CM

## 2017-04-07 PROBLEM — M75.50 BURSITIS, SHOULDER: Status: ACTIVE | Noted: 2017-04-07

## 2017-04-07 PROCEDURE — 99213 OFFICE O/P EST LOW 20 MIN: CPT | Performed by: FAMILY MEDICINE

## 2017-04-07 RX ORDER — LEVOFLOXACIN 750 MG/1
750 TABLET ORAL DAILY
Qty: 7 TABLET | Refills: 0 | Status: SHIPPED | OUTPATIENT
Start: 2017-04-07 | End: 2017-04-21

## 2017-04-07 RX ORDER — MELOXICAM 7.5 MG/1
7.5 TABLET ORAL DAILY
Qty: 30 TABLET | Refills: 11 | Status: SHIPPED | OUTPATIENT
Start: 2017-04-07 | End: 2020-01-30

## 2017-04-07 NOTE — PATIENT INSTRUCTIONS
Levofloxacin tablets  What is this medicine?  LEVOFLOXACIN (jaquan serra JESU a sin) is a quinolone antibiotic. It is used to treat certain kinds of bacterial infections. It will not work for colds, flu, or other viral infections.  This medicine may be used for other purposes; ask your health care provider or pharmacist if you have questions.  COMMON BRAND NAME(S): Levaquin, Levaquin Leva-Evans  What should I tell my health care provider before I take this medicine?  They need to know if you have any of these conditions:  -bone problems  -cerebral disease  -diabetes  -history of low levels of potassium in the blood  -irregular heartbeat  -joint problems  -kidney disease  -liver disease  -myasthenia gravis  -seizures  -tendon problems  -tingling of the fingers or toes, or other nerve disorder  -an unusual or allergic reaction to levofloxacin, other quinolone antibiotics, foods, dyes, or preservatives  -pregnant or trying to get pregnant  -breast-feeding  How should I use this medicine?  Take this medicine by mouth with a full glass of water. Follow the directions on the prescription label. This medicine can be taken with or without food. Take your medicine at regular intervals. Do not take your medicine more often than directed. Do not skip doses or stop your medicine early even if you feel better. Do not stop taking except on your doctor's advice.  A special MedGuide will be given to you by the pharmacist with each prescription and refill. Be sure to read this information carefully each time.  Talk to your pediatrician regarding the use of this medicine in children. While this drug may be prescribed for children as young as 6 months for selected conditions, precautions do apply.  Overdosage: If you think you have taken too much of this medicine contact a poison control center or emergency room at once.  NOTE: This medicine is only for you. Do not share this medicine with others.  What if I miss a dose?  If you miss a dose,  take it as soon as you remember. If it is almost time for your next dose, take only that dose. Do not take double or extra doses.  What may interact with this medicine?  Do not take this medicine with any of the following medications:  -bepridil  -certain medicines for depression, anxiety, or psychotic disturbances like pimozide, thioridazine, and ziprasidone  -certain medicines for irregular heart beat like dofetilide and dronedarone  -cisapride  -halofantrine  This medicine may also interact with the following medications:  -antacids  -birth control pills  -certain medicines for diabetes, like glipizide, glyburide, or insulin  -didanosine buffered tablets or powder  -multivitamins  -NSAIDS, medicines for pain and inflammation, like ibuprofen or naproxen  -steroid medicines like prednisone or cortisone  -sucralfate  -theophylline  -warfarin  This list may not describe all possible interactions. Give your health care provider a list of all the medicines, herbs, non-prescription drugs, or dietary supplements you use. Also tell them if you smoke, drink alcohol, or use illegal drugs. Some items may interact with your medicine.  What should I watch for while using this medicine?  Tell your doctor or healthcare professional if your symptoms do not start to get better or if they get worse.  Do not treat diarrhea with over the counter products. Contact your doctor if you have diarrhea that lasts more than 2 days or if it is severe and watery.  Check with your doctor or health care professional if you get an attack of severe diarrhea, nausea and vomiting, or if you sweat a lot. The loss of too much body fluid can make it dangerous for you to take this medicine.  You may get drowsy or dizzy. Do not drive, use machinery, or do anything that needs mental alertness until you know how this medicine affects you. Do not sit or stand up quickly, especially if you are an older patient. This reduces the risk of dizzy or fainting  spells.  This medicine can make you more sensitive to the sun. Keep out of the sun. If you cannot avoid being in the sun, wear protective clothing and use a sunscreen. Do not use sun lamps or tanning beds/booths. Contact your doctor if you get a sunburn.  If you are a diabetic monitor your blood glucose carefully. If you get an unusual reading stop taking this medicine and call your doctor right away.  Avoid antacids, calcium, iron, and zinc products for 2 hours before and 2 hours after taking a dose of this medicine.  What side effects may I notice from receiving this medicine?  Side effects that you should report to your doctor or health care professional as soon as possible:  -allergic reactions like skin rash or hives, swelling of the face, lips, or tongue  -anxious  -breathing problems  -confusion  -depressed mood  -diarrhea  -dizziness  -fast, irregular heartbeat  -hallucination, loss of contact with reality  -joint, muscle, or tendon pain or swelling  -muscle weakness  -pain, tingling, numbness in the hands or feet  -seizures  -signs and symptoms of high blood sugar such as dizziness; dry mouth; dry skin; fruity breath; nausea; stomach pain; increased hunger or thirst; increased urination  -signs and symptoms of liver injury like dark yellow or brown urine; general ill feeling or flu-like symptoms; light-colored stools; loss of appetite; nausea; right upper belly pain; unusually weak or tired; yellowing of the eyes or skin  -signs and symptoms of low blood sugar such as feeling anxious; confusion; dizziness; increased hunger; unusually weak or tired; sweating; shakiness; cold; irritable; headache; blurred vision; fast heartbeat; loss of consciousness  -suicidal thoughts or other mood changes  -sunburn  -unusually weak or tired  Side effects that usually do not require medical attention (report to your doctor or health care professional if they continue or are bothersome):  -constipation  -dry  mouth  -headache  -nausea, vomiting  -trouble sleeping  This list may not describe all possible side effects. Call your doctor for medical advice about side effects. You may report side effects to FDA at 0-853-FDA-1079.  Where should I keep my medicine?  Keep out of the reach of children.  Store at room temperature between 15 and 30 degrees C (59 and 86 degrees F). Keep in a tightly closed container. Throw away any unused medicine after the expiration date.  NOTE: This sheet is a summary. It may not cover all possible information. If you have questions about this medicine, talk to your doctor, pharmacist, or health care provider.     © 2017, [employer]. (2017-02-03 12:40:27)  Meloxicam tablets  What is this medicine?  MELOXICAM (steph OX i cam) is a non-steroidal anti-inflammatory drug (NSAID). It is used to reduce swelling and to treat pain. It may be used for osteoarthritis, rheumatoid arthritis, or juvenile rheumatoid arthritis.  This medicine may be used for other purposes; ask your health care provider or pharmacist if you have questions.  COMMON BRAND NAME(S): Kacie  What should I tell my health care provider before I take this medicine?  They need to know if you have any of these conditions:  -bleeding disorders  -cigarette smoker  -coronary artery bypass graft (CABG) surgery within the past 2 weeks  -drink more than 3 alcohol-containing drinks per day  -heart disease  -high blood pressure  -history of stomach bleeding  -kidney disease  -liver disease  -lung or breathing disease, like asthma  -stomach or intestine problems  -an unusual or allergic reaction to meloxicam, aspirin, other NSAIDs, other medicines, foods, dyes, or preservatives  -pregnant or trying to get pregnant  -breast-feeding  How should I use this medicine?  Take this medicine by mouth with a full glass of water. Follow the directions on the prescription label. You can take it with or without food. If it upsets your stomach, take it  with food. Take your medicine at regular intervals. Do not take it more often than directed. Do not stop taking except on your doctor's advice.  A special MedGuide will be given to you by the pharmacist with each prescription and refill. Be sure to read this information carefully each time.  Talk to your pediatrician regarding the use of this medicine in children. While this drug may be prescribed for selected conditions, precautions do apply.  Patients over 65 years old may have a stronger reaction and need a smaller dose.  Overdosage: If you think you have taken too much of this medicine contact a poison control center or emergency room at once.  NOTE: This medicine is only for you. Do not share this medicine with others.  What if I miss a dose?  If you miss a dose, take it as soon as you can. If it is almost time for your next dose, take only that dose. Do not take double or extra doses.  What may interact with this medicine?  Do not take this medicine with any of the following medications:  -cidofovir  -ketorolac  This medicine may also interact with the following medications:  -aspirin and aspirin-like medicines  -certain medicines for blood pressure, heart disease, irregular heart beat  -certain medicines for depression, anxiety, or psychotic disturbances  -certain medicines that treat or prevent blood clots like warfarin, enoxaparin, dalteparin, apixaban, dabigatran, rivaroxaban  -cyclosporine  -digoxin  -diuretics  -methotrexate  -other NSAIDs, medicines for pain and inflammation, like ibuprofen and naproxen  -pemetrexed  This list may not describe all possible interactions. Give your health care provider a list of all the medicines, herbs, non-prescription drugs, or dietary supplements you use. Also tell them if you smoke, drink alcohol, or use illegal drugs. Some items may interact with your medicine.  What should I watch for while using this medicine?  Tell your doctor or healthcare professional if your  symptoms do not start to get better or if they get worse.  Do not take other medicines that contain aspirin, ibuprofen, or naproxen with this medicine. Side effects such as stomach upset, nausea, or ulcers may be more likely to occur. Many medicines available without a prescription should not be taken with this medicine.  This medicine can cause ulcers and bleeding in the stomach and intestines at any time during treatment. This can happen with no warning and may cause death. There is increased risk with taking this medicine for a long time. Smoking, drinking alcohol, older age, and poor health can also increase risks. Call your doctor right away if you have stomach pain or blood in your vomit or stool.  This medicine does not prevent heart attack or stroke. In fact, this medicine may increase the chance of a heart attack or stroke. The chance may increase with longer use of this medicine and in people who have heart disease. If you take aspirin to prevent heart attack or stroke, talk with your doctor or health care professional.  What side effects may I notice from receiving this medicine?  Side effects that you should report to your doctor or health care professional as soon as possible:  -allergic reactions like skin rash, itching or hives, swelling of the face, lips, or tongue  -nausea, vomiting  -signs and symptoms of a blood clot such as breathing problems; changes in vision; chest pain; severe, sudden headache; pain, swelling, warmth in the leg; trouble speaking; sudden numbness or weakness of the face, arm, or leg  -signs and symptoms of bleeding such as bloody or black, tarry stools; red or dark-brown urine; spitting up blood or brown material that looks like coffee grounds; red spots on the skin; unusual bruising or bleeding from the eye, gums, or nose  -signs and symptoms of liver injury like dark yellow or brown urine; general ill feeling or flu-like symptoms; light-colored stools; loss of appetite;  nausea; right upper belly pain; unusually weak or tired; yellowing of the eyes or skin  -signs and symptoms of stroke like changes in vision; confusion; trouble speaking or understanding; severe headaches; sudden numbness or weakness of the face, arm, or leg; trouble walking; dizziness; loss of balance or coordination  Side effects that usually do not require medical attention (report to your doctor or health care professional if they continue or are bothersome):  -constipation  -diarrhea  -gas  This list may not describe all possible side effects. Call your doctor for medical advice about side effects. You may report side effects to FDA at 0-878-JQC-9544.  Where should I keep my medicine?  Keep out of the reach of children.  Store at room temperature between 15 and 30 degrees C (59 and 86 degrees F). Throw away any unused medicine after the expiration date.  NOTE: This sheet is a summary. It may not cover all possible information. If you have questions about this medicine, talk to your doctor, pharmacist, or health care provider.     © 2017, [employer]. (2017-01-18 19:28:16)

## 2017-04-07 NOTE — PROGRESS NOTES
Subjective   Amos Miranda is a 57 y.o. male.     Problem List  1. Essential Hypertension  2. Hyperlipidemia  3. Erectile Dysfunction  4. Right shoulder pain / calcific tendinitis/bursitis  5. Renal Impairment    Pt is 58 yo AAM with the above medical issues who is here for recheck.  Has been going to PT/OT regarding right shoulder pain.  On x-ray pt has calcific tendinitis/bursitis. Continues to have shoulder pain. Has only done 3 sessions of PT/OT. Also has tried flexeril which helps but voltaren gel does not. Still has pain lifting arm above shoulder height. Pain is about 6/10 on severity today.      On last labwork pt had elevated WBC >20.0 with increased neutrophil count on differential.  Pt states he has been feeling under the weather.  He has been coughing and congested to the point it has been hurting his chest. Denies any recent ill contacts or travel.  No fever.      For hyperlipidemia - Pt on statin medication. HDL was low. Pt is tolerating medication    For hypertension - currently controlled with lisinopril 40 mg PO q daily along with HCTZ 12.5 mg PO q daily       Cough   This is a new problem. The current episode started 1 to 4 weeks ago. The problem has been unchanged. The problem occurs every few hours. The cough is non-productive. Associated symptoms include chest pain. Pertinent negatives include no chills, ear congestion, ear pain, fever, headaches, heartburn, hemoptysis, nasal congestion, postnasal drip, rash, rhinorrhea, sore throat, shortness of breath, sweats, weight loss or wheezing. Nothing aggravates the symptoms. Risk factors for lung disease include animal exposure. He has tried nothing for the symptoms. The treatment provided no relief. There is no history of asthma, bronchiectasis, bronchitis, COPD, emphysema, environmental allergies or pneumonia.     The following portions of the patient's history were reviewed and updated as appropriate: allergies, current medications, past  "family history, past medical history, past social history, past surgical history and problem list.    Review of Systems   Constitutional: Negative.  Negative for chills, fever and weight loss.   HENT: Negative.  Negative for ear pain, postnasal drip, rhinorrhea and sore throat.    Eyes: Negative.    Respiratory: Positive for cough. Negative for hemoptysis, shortness of breath and wheezing.    Cardiovascular: Positive for chest pain.   Gastrointestinal: Negative.  Negative for heartburn.   Endocrine: Negative.    Genitourinary: Negative.    Musculoskeletal: Positive for arthralgias.   Skin: Negative for rash.   Allergic/Immunologic: Negative.  Negative for environmental allergies.   Neurological: Negative.  Negative for headaches.   Hematological: Negative.    Psychiatric/Behavioral: Negative.        Objective    /72  Pulse 99  Temp 97.9 °F (36.6 °C)  Resp 16  Ht 66\" (167.6 cm)  Wt 176 lb (79.8 kg)  BMI 28.41 kg/m2    /72  Pulse 99  Temp 97.9 °F (36.6 °C)  Resp 16  Ht 66\" (167.6 cm)  Wt 176 lb (79.8 kg)  BMI 28.41 kg/m2     Chemistry        Component Value Date/Time     04/05/2017 0935    K 4.5 04/05/2017 0935     04/05/2017 0935    CO2 28.0 04/05/2017 0935    BUN 13 04/05/2017 0935    CREATININE 1.42 (H) 04/05/2017 0935        Component Value Date/Time    CALCIUM 9.7 04/05/2017 0935    ALKPHOS 68 04/05/2017 0935    AST 29 04/05/2017 0935    ALT 38 04/05/2017 0935    BILITOT 1.2 04/05/2017 0935        .[email or web address]  Lab Results   Component Value Date    CHOL 144 04/05/2017     Lab Results   Component Value Date    TRIG 49 04/05/2017    TRIG 66 02/23/2016     Lab Results   Component Value Date    HDL 53 (L) 04/05/2017    HDL 49 (L) 02/23/2016     Lab Results   Component Value Date    LDLCALC 73 02/23/2016     No results found for: LDL  No results found for: HDLLDLRATIO  No components found for: CHOLHDL  Lab Results   Component Value Date    HGBA1C 5.5 09/23/2016     No results found for: " TSH, B0CESFP, A5LCZFW, THYROIDAB  Physical Exam   Constitutional: He is oriented to person, place, and time. He appears well-developed and well-nourished.   HENT:   Head: Normocephalic and atraumatic.   Right Ear: External ear normal.   Left Ear: External ear normal.   Eyes: Conjunctivae and EOM are normal. Pupils are equal, round, and reactive to light. Right eye exhibits no discharge. Left eye exhibits no discharge. No scleral icterus.   Neck: Normal range of motion. Neck supple. No JVD present. No tracheal deviation present. No thyromegaly present.   Cardiovascular: Normal rate, regular rhythm and normal heart sounds.    Pulmonary/Chest: Effort normal and breath sounds normal. No stridor. No respiratory distress. He has no wheezes.   Lungs clear to ascultation    Abdominal: Soft. Bowel sounds are normal. He exhibits no distension and no mass. There is no tenderness. There is no rebound and no guarding. No hernia.   Musculoskeletal: Normal range of motion. He exhibits no edema, tenderness or deformity.   Lymphadenopathy:     He has no cervical adenopathy.   Neurological: He is alert and oriented to person, place, and time. He has normal reflexes. No cranial nerve deficit. Coordination normal.   Skin: Skin is warm and dry. No rash noted. He is not diaphoretic. No erythema. No pallor.   Psychiatric: He has a normal mood and affect. His behavior is normal. Judgment and thought content normal.   Nursing note and vitals reviewed.      Assessment/Plan   Problems Addressed this Visit        Respiratory    Cough       Nervous and Auditory    Right shoulder pain       Musculoskeletal and Integument    Bursitis, shoulder       Immune and Lymphatic    Leukocytosis - Primary    Relevant Medications    meloxicam (MOBIC) 7.5 MG tablet    Other Relevant Orders    XR Chest PA & Lateral    Neutrophilia    Relevant Medications    meloxicam (MOBIC) 7.5 MG tablet      - For leuocytosis and cough will get chest x-ray for possible  pneumonia or URI. Will treat empirically with abx levaquin 750 mg PO q daily for 7 days. Drug information given. Advised Tylenol for fever.  Pt to present to ER or call 911 if symptoms get worrisome or severe  - for bursitis of right shoulder - will continue with PT/OT for now. Pt declined Orthopedic referral right now.  Will stop voltaren gel and will start on mobic 7.5 mg PO q daily. Drug information given.  Pt to take with food and water  - recheck in 2 weeks   - for hypertension - BP at goal - continue with HCTZ 12.5 mg daily along with lisinopril 40 mg PO q daily  - hyperlipidemia - continue with statin medication.  Advised high healthy fat diet such as olive oil, coconut oil, healthy fish like salmon

## 2017-04-10 ENCOUNTER — TELEPHONE (OUTPATIENT)
Dept: PHYSICAL THERAPY | Facility: HOSPITAL | Age: 57
End: 2017-04-10

## 2017-04-13 ENCOUNTER — APPOINTMENT (OUTPATIENT)
Dept: PHYSICAL THERAPY | Facility: HOSPITAL | Age: 57
End: 2017-04-13

## 2017-04-13 RX ORDER — ACETAMINOPHEN 160 MG
TABLET,CHEWABLE ORAL
Qty: 30 TABLET | Refills: 6 | Status: SHIPPED | OUTPATIENT
Start: 2017-04-13

## 2017-04-21 ENCOUNTER — OFFICE VISIT (OUTPATIENT)
Dept: FAMILY MEDICINE CLINIC | Facility: CLINIC | Age: 57
End: 2017-04-21

## 2017-04-21 VITALS
BODY MASS INDEX: 28.28 KG/M2 | RESPIRATION RATE: 16 BRPM | TEMPERATURE: 98.6 F | SYSTOLIC BLOOD PRESSURE: 106 MMHG | DIASTOLIC BLOOD PRESSURE: 76 MMHG | HEIGHT: 66 IN | HEART RATE: 85 BPM | WEIGHT: 176 LBS

## 2017-04-21 DIAGNOSIS — M25.511 RIGHT SHOULDER PAIN, UNSPECIFIED CHRONICITY: ICD-10-CM

## 2017-04-21 DIAGNOSIS — D72.9 NEUTROPHILIA: ICD-10-CM

## 2017-04-21 DIAGNOSIS — M75.51 BURSITIS, SHOULDER, RIGHT: ICD-10-CM

## 2017-04-21 DIAGNOSIS — D72.829 LEUKOCYTOSIS, UNSPECIFIED TYPE: ICD-10-CM

## 2017-04-21 DIAGNOSIS — N28.9 RENAL IMPAIRMENT: Primary | ICD-10-CM

## 2017-04-21 DIAGNOSIS — J06.9 UPPER RESPIRATORY TRACT INFECTION, UNSPECIFIED TYPE: ICD-10-CM

## 2017-04-21 PROCEDURE — 99214 OFFICE O/P EST MOD 30 MIN: CPT | Performed by: FAMILY MEDICINE

## 2017-04-21 NOTE — PROGRESS NOTES
"Subjective   Amos Miranda is a 57 y.o. male.     History of Present Illness     Problem List  1. Essential Hypertension  2. Hyperlipidemia  3. Erectile Dysfunction  4. Right shoulder pain / calcific tendinitis/bursitis  5. Renal Impairment    Pt is 58 yo AAM with the above medical issues. On last visit pt had URI and was treated with levaquin for 7 days.  State she is feeling much better and cough and congestion improved. Did not get chest x-ray from last visit. Denies any fever    States has right shoulder issues still. On x-ray pt has calcific tendinitis/bursitis. Is currently on mobic 7.5 mg PO q daily which helps somewhat along with flexeril 10 mg PO TID PRN. On last labwork pt had renal impairment. Pain in shoulder about 5/10 on severity. Mainly hurts when lifting right arm overhead.             The following portions of the patient's history were reviewed and updated as appropriate: allergies, current medications, past family history, past medical history, past social history, past surgical history and problem list.    Review of Systems   Constitutional: Negative.    HENT: Negative.    Eyes: Negative.    Respiratory: Negative.    Cardiovascular: Negative.    Gastrointestinal: Negative.    Endocrine: Negative.    Genitourinary: Negative.    Musculoskeletal: Positive for arthralgias.   Skin: Negative.    Allergic/Immunologic: Negative.    Neurological: Negative.    Hematological: Negative.    Psychiatric/Behavioral: Negative.        Objective    /76  Pulse 85  Temp 98.6 °F (37 °C)  Resp 16  Ht 66\" (167.6 cm)  Wt 176 lb (79.8 kg)  BMI 28.41 kg/m2    Chemistry        Component Value Date/Time     04/05/2017 0935    K 4.5 04/05/2017 0935     04/05/2017 0935    CO2 28.0 04/05/2017 0935    BUN 13 04/05/2017 0935    CREATININE 1.42 (H) 04/05/2017 0935        Component Value Date/Time    CALCIUM 9.7 04/05/2017 0935    ALKPHOS 68 04/05/2017 0935    AST 29 04/05/2017 0935    ALT 38 " 04/05/2017 0935    BILITOT 1.2 04/05/2017 0935        Lab Results   Component Value Date    WBC 23.21 (H) 04/05/2017    HGB 15.2 04/05/2017    HCT 43.2 04/05/2017    MCV 87.1 04/05/2017     04/05/2017     Lab Results   Component Value Date    CHOL 144 04/05/2017     Lab Results   Component Value Date    TRIG 49 04/05/2017    TRIG 66 02/23/2016     Lab Results   Component Value Date    HDL 53 (L) 04/05/2017    HDL 49 (L) 02/23/2016     Lab Results   Component Value Date    LDLCALC 73 02/23/2016     No results found for: LDL  No results found for: HDLLDLRATIO  No components found for: CHOLHDL  Lab Results   Component Value Date    HGBA1C 5.5 09/23/2016     No results found for: TSH, R9TCAVY, L5XFUBT, THYROIDAB  Physical Exam   Constitutional: He is oriented to person, place, and time. He appears well-developed and well-nourished. No distress.   HENT:   Head: Normocephalic and atraumatic.   Right Ear: External ear normal.   Left Ear: External ear normal.   Eyes: Conjunctivae and EOM are normal. Pupils are equal, round, and reactive to light.   Neck: Normal range of motion. Neck supple. No JVD present. No tracheal deviation present. No thyromegaly present.   Cardiovascular: Normal rate, regular rhythm and normal heart sounds.    Pulmonary/Chest: Effort normal and breath sounds normal. No stridor. No respiratory distress. He has no wheezes.   Abdominal: Soft. Bowel sounds are normal. He exhibits no distension and no mass. There is no tenderness. There is no guarding. No hernia.   Musculoskeletal: He exhibits tenderness. He exhibits no edema or deformity.        Right shoulder: He exhibits decreased range of motion, tenderness, bony tenderness and pain.   Lymphadenopathy:     He has no cervical adenopathy.   Neurological: He is alert and oriented to person, place, and time. He has normal reflexes. No cranial nerve deficit. Coordination normal.   Skin: Skin is warm and dry. No rash noted. He is not diaphoretic. No  erythema. No pallor.   Psychiatric: He has a normal mood and affect. His behavior is normal. Judgment and thought content normal.   Nursing note and vitals reviewed.      Assessment/Plan   Problems Addressed this Visit        Respiratory    Upper respiratory tract infection       Nervous and Auditory    Right shoulder pain       Musculoskeletal and Integument    Bursitis, shoulder       Immune and Lymphatic    Leukocytosis    Neutrophilia       Genitourinary    Renal impairment - Primary    Relevant Orders    Basic metabolic panel    CBC Auto Differential        -URI/leukocytosis/neutrophilia symptoms of URI resolved. Pt clinically doing better. Will repeat CBC to trend leukocytosis and neutrophilia  - for right shoulder pain/ bursitis/ tendinitis - pt declines Orthopedic referral for now. Continue with mobic 7.5 mg PO q daily and flexeril  - check CMP for renal impairment. If stable will go up on dosage of mobic to 15 mg PO q daily  - recheck in 3 months or earlier if symptoms gets worse.

## 2017-04-23 PROBLEM — N28.9 RENAL IMPAIRMENT: Status: ACTIVE | Noted: 2017-04-23

## 2017-04-23 PROBLEM — J06.9 UPPER RESPIRATORY TRACT INFECTION: Status: ACTIVE | Noted: 2017-04-23

## 2017-05-05 RX ORDER — HYDROCHLOROTHIAZIDE 12.5 MG/1
TABLET ORAL
Qty: 30 TABLET | Refills: 6 | Status: SHIPPED | OUTPATIENT
Start: 2017-05-05 | End: 2018-06-04 | Stop reason: SDUPTHER

## 2017-05-12 LAB
ANION GAP SERPL CALCULATED.3IONS-SCNC: 8 MMOL/L (ref 5–15)
BASOPHILS # BLD AUTO: 0.02 10*3/MM3 (ref 0–0.2)
BASOPHILS NFR BLD AUTO: 0.2 % (ref 0–2)
BUN BLD-MCNC: 12 MG/DL (ref 7–21)
BUN/CREAT SERPL: 9.5 (ref 7–25)
CALCIUM SPEC-SCNC: 8.9 MG/DL (ref 8.4–10.2)
CHLORIDE SERPL-SCNC: 99 MMOL/L (ref 95–110)
CO2 SERPL-SCNC: 31 MMOL/L (ref 22–31)
CREAT BLD-MCNC: 1.26 MG/DL (ref 0.7–1.3)
DEPRECATED RDW RBC AUTO: 44.4 FL (ref 35.1–43.9)
EOSINOPHIL # BLD AUTO: 0.19 10*3/MM3 (ref 0–0.7)
EOSINOPHIL NFR BLD AUTO: 2.2 % (ref 0–7)
ERYTHROCYTE [DISTWIDTH] IN BLOOD BY AUTOMATED COUNT: 14.2 % (ref 11.5–14.5)
GFR SERPL CREATININE-BSD FRML MDRD: 71 ML/MIN/1.73 (ref 56–130)
GLUCOSE BLD-MCNC: 88 MG/DL (ref 60–100)
HCT VFR BLD AUTO: 41 % (ref 39–49)
HGB BLD-MCNC: 14.4 G/DL (ref 13.7–17.3)
IMM GRANULOCYTES # BLD: 0.02 10*3/MM3 (ref 0–0.02)
IMM GRANULOCYTES NFR BLD: 0.2 % (ref 0–0.5)
LYMPHOCYTES # BLD AUTO: 2.14 10*3/MM3 (ref 0.6–4.2)
LYMPHOCYTES NFR BLD AUTO: 25 % (ref 10–50)
MCH RBC QN AUTO: 29.8 PG (ref 26.5–34)
MCHC RBC AUTO-ENTMCNC: 35.1 G/DL (ref 31.5–36.3)
MCV RBC AUTO: 84.9 FL (ref 80–98)
MONOCYTES # BLD AUTO: 0.86 10*3/MM3 (ref 0–0.9)
MONOCYTES NFR BLD AUTO: 10 % (ref 0–12)
NEUTROPHILS # BLD AUTO: 5.33 10*3/MM3 (ref 2–8.6)
NEUTROPHILS NFR BLD AUTO: 62.4 % (ref 37–80)
PLATELET # BLD AUTO: 178 10*3/MM3 (ref 150–450)
PMV BLD AUTO: 11.8 FL (ref 8–12)
POTASSIUM BLD-SCNC: 4 MMOL/L (ref 3.5–5.1)
RBC # BLD AUTO: 4.83 10*6/MM3 (ref 4.37–5.74)
SODIUM BLD-SCNC: 138 MMOL/L (ref 137–145)
WBC NRBC COR # BLD: 8.56 10*3/MM3 (ref 3.2–9.8)

## 2017-05-12 PROCEDURE — 85025 COMPLETE CBC W/AUTO DIFF WBC: CPT | Performed by: FAMILY MEDICINE

## 2017-05-12 PROCEDURE — 80048 BASIC METABOLIC PNL TOTAL CA: CPT | Performed by: FAMILY MEDICINE

## 2017-05-23 ENCOUNTER — DOCUMENTATION (OUTPATIENT)
Dept: PHYSICAL THERAPY | Facility: HOSPITAL | Age: 57
End: 2017-05-23

## 2017-06-29 RX ORDER — TADALAFIL 10 MG
TABLET ORAL
Qty: 10 TABLET | Refills: 2 | Status: SHIPPED | OUTPATIENT
Start: 2017-06-29 | End: 2020-04-02

## 2018-03-28 RX ORDER — LISINOPRIL 40 MG/1
TABLET ORAL
Qty: 30 TABLET | Refills: 11 | Status: SHIPPED | OUTPATIENT
Start: 2018-03-28 | End: 2019-05-03 | Stop reason: SDUPTHER

## 2018-06-04 RX ORDER — HYDROCHLOROTHIAZIDE 12.5 MG/1
TABLET ORAL
Qty: 30 TABLET | Refills: 6 | Status: SHIPPED | OUTPATIENT
Start: 2018-06-04 | End: 2019-05-03 | Stop reason: SDUPTHER

## 2019-05-03 ENCOUNTER — TELEPHONE (OUTPATIENT)
Dept: FAMILY MEDICINE CLINIC | Facility: CLINIC | Age: 59
End: 2019-05-03

## 2019-05-03 RX ORDER — HYDROCHLOROTHIAZIDE 12.5 MG/1
12.5 TABLET ORAL DAILY
Qty: 30 TABLET | Refills: 3 | Status: SHIPPED | OUTPATIENT
Start: 2019-05-03 | End: 2019-12-03 | Stop reason: SDUPTHER

## 2019-05-03 RX ORDER — LISINOPRIL 40 MG/1
40 TABLET ORAL DAILY
Qty: 30 TABLET | Refills: 3 | Status: SHIPPED | OUTPATIENT
Start: 2019-05-03 | End: 2019-10-16 | Stop reason: SDUPTHER

## 2019-10-16 RX ORDER — LISINOPRIL 40 MG/1
40 TABLET ORAL DAILY
Qty: 30 TABLET | Refills: 0 | Status: SHIPPED | OUTPATIENT
Start: 2019-10-16 | End: 2019-12-03 | Stop reason: SDUPTHER

## 2019-12-03 RX ORDER — HYDROCHLOROTHIAZIDE 12.5 MG/1
12.5 TABLET ORAL DAILY
Qty: 30 TABLET | Refills: 0 | Status: SHIPPED | OUTPATIENT
Start: 2019-12-03 | End: 2020-01-16 | Stop reason: SDUPTHER

## 2019-12-03 RX ORDER — LISINOPRIL 40 MG/1
40 TABLET ORAL DAILY
Qty: 30 TABLET | Refills: 0 | Status: SHIPPED | OUTPATIENT
Start: 2019-12-03 | End: 2020-01-16 | Stop reason: SDUPTHER

## 2020-01-16 RX ORDER — HYDROCHLOROTHIAZIDE 12.5 MG/1
12.5 TABLET ORAL DAILY
Qty: 30 TABLET | Refills: 0 | Status: SHIPPED | OUTPATIENT
Start: 2020-01-16 | End: 2020-01-30

## 2020-01-16 RX ORDER — LISINOPRIL 40 MG/1
40 TABLET ORAL DAILY
Qty: 30 TABLET | Refills: 0 | Status: SHIPPED | OUTPATIENT
Start: 2020-01-16 | End: 2020-03-09 | Stop reason: SDUPTHER

## 2020-01-16 NOTE — PROGRESS NOTES
Subjective:  Amos Miranda is a 60 y.o. male who presents for       Patient Active Problem List   Diagnosis   • Need for hepatitis C screening test   • Hyperlipidemia   • Essential hypertension   • Low back pain without sciatica   • Right shoulder pain   • Leukocytosis   • Bursitis, shoulder   • Cough   • Neutrophilia   • Renal impairment   • Upper respiratory tract infection   • Right-sided low back pain with right-sided sciatica           Current Outpatient Medications:   •  lisinopril (PRINIVIL,ZESTRIL) 40 MG tablet, Take 1 tablet by mouth Daily., Disp: 30 tablet, Rfl: 0  •  RA ASPIRIN ADULT LOW STRENGTH 81 MG chewable tablet, chew and swallow 1 tablet by mouth once daily, Disp: 30 tablet, Rfl: 6  •  atorvastatin (LIPITOR) 40 MG tablet, Take 40 mg by mouth every night., Disp: , Rfl:   •  chlorthalidone (HYGROTON) 25 MG tablet, Take 1 tablet by mouth Daily., Disp: 30 tablet, Rfl: 3  •  CIALIS 10 MG tablet, TAKE 1 TABLET BY MOUTH EVERY 72 HOURS AS NEEDED FOR ERCTILE DYSFUNCTION, Disp: 10 tablet, Rfl: 2  •  cyclobenzaprine (FLEXERIL) 10 MG tablet, Take 1 tablet by mouth At Night As Needed for Muscle Spasms., Disp: 30 tablet, Rfl: 3      Pt is 60 yo male with management of HTN, HLP,  Right shoulder pain, low back pain with sciatica, ED        4/21/17 Pt is 56 yo AAM with the above medical issues. On last visit pt had URI and was treated with levaquin for 7 days.  State she is feeling much better and cough and congestion improved. Did not get chest x-ray from last visit. Denies any fever  States has right shoulder issues still. On x-ray pt has calcific tendinitis/bursitis. Is currently on mobic 7.5 mg PO q daily which helps somewhat along with flexeril 10 mg PO TID PRN. On last labwork pt had renal impairment. Pain in shoulder about 5/10 on severity. Mainly hurts when lifting right arm overhead.      1/30/20 pt is heref or recheck and followup. Have not seen pt since 2017. He is due for new labwork. He  continues his BP medicaitons HCTZ 12.5 mg daily and lisionpril 40 mg daily. He takes lipiro and aspirin His main issues. He states back pain comes from lifting heavy objects at works. He may lift up to 60 lbs a day.           Hypertension   This is a chronic problem. The current episode started more than 1 year ago. The problem has been waxing and waning since onset. The problem is uncontrolled. Pertinent negatives include no anxiety, blurred vision, chest pain, headaches, malaise/fatigue, neck pain, orthopnea, palpitations, peripheral edema, PND, shortness of breath or sweats. Risk factors for coronary artery disease include dyslipidemia and male gender. Past treatments include ACE inhibitors and diuretics. Current antihypertension treatment includes diuretics and ACE inhibitors. The current treatment provides no improvement. There are no compliance problems.  There is no history of angina, kidney disease, CAD/MI, CVA, heart failure, left ventricular hypertrophy, PVD or retinopathy. There is no history of chronic renal disease, coarctation of the aorta, hyperaldosteronism, hypercortisolism, a hypertension causing med, pheochromocytoma, renovascular disease or sleep apnea.   Back Pain   This is a recurrent problem. The current episode started more than 1 month ago. The problem occurs constantly. The problem is unchanged. The pain is present in the lumbar spine and gluteal. The quality of the pain is described as aching. The pain radiates to the right knee. The pain is at a severity of 2/10. The pain is moderate. The pain is the same all the time. The symptoms are aggravated by lying down, position, bending, sitting and standing. Stiffness is present all day. Associated symptoms include weakness. Pertinent negatives include no abdominal pain, bladder incontinence, bowel incontinence, chest pain, dysuria, fever, headaches, leg pain, numbness, paresis, paresthesias, pelvic pain, perianal numbness, tingling or weight  loss. He has tried nothing for the symptoms. The treatment provided no relief.          Review of Systems  Review of Systems   Constitutional: Positive for activity change. Negative for appetite change, fever, malaise/fatigue and weight loss.   HENT: Negative for postnasal drip, rhinorrhea, sinus pressure, sinus pain, sneezing, trouble swallowing and voice change.    Eyes: Negative for blurred vision.   Respiratory: Negative for choking, chest tightness, shortness of breath, wheezing and stridor.    Cardiovascular: Negative for chest pain, palpitations, orthopnea and PND.   Gastrointestinal: Negative for abdominal pain, bowel incontinence and diarrhea.   Genitourinary: Negative for bladder incontinence, dysuria and pelvic pain.   Musculoskeletal: Positive for arthralgias and back pain. Negative for neck pain.   Neurological: Positive for weakness. Negative for tingling, numbness, headaches and paresthesias.       Patient Active Problem List   Diagnosis   • Need for hepatitis C screening test   • Hyperlipidemia   • Essential hypertension   • Low back pain without sciatica   • Right shoulder pain   • Leukocytosis   • Bursitis, shoulder   • Cough   • Neutrophilia   • Renal impairment   • Upper respiratory tract infection   • Right-sided low back pain with right-sided sciatica     No past surgical history on file.  Social History     Socioeconomic History   • Marital status:      Spouse name: Not on file   • Number of children: Not on file   • Years of education: Not on file   • Highest education level: Not on file   Tobacco Use   • Smoking status: Never Smoker   • Smokeless tobacco: Never Used   Substance and Sexual Activity   • Alcohol use: No   • Drug use: No     Family History   Problem Relation Age of Onset   • Heart disease Mother    • Cancer Father         lung   • Diabetes Other    • Hypertension Other    • Stroke Other      No visits with results within 6 Month(s) from this visit.   Latest known visit  with results is:   Office Visit on 04/21/2017   Component Date Value Ref Range Status   • Glucose 05/12/2017 88  60 - 100 mg/dL Final   • BUN 05/12/2017 12  7 - 21 mg/dL Final   • Creatinine 05/12/2017 1.26  0.70 - 1.30 mg/dL Final   • Sodium 05/12/2017 138  137 - 145 mmol/L Final   • Potassium 05/12/2017 4.0  3.5 - 5.1 mmol/L Final   • Chloride 05/12/2017 99  95 - 110 mmol/L Final   • CO2 05/12/2017 31.0  22.0 - 31.0 mmol/L Final   • Calcium 05/12/2017 8.9  8.4 - 10.2 mg/dL Final   • eGFR   Amer 05/12/2017 71  56 - 130 mL/min/1.73 Final   • BUN/Creatinine Ratio 05/12/2017 9.5  7.0 - 25.0 Final   • Anion Gap 05/12/2017 8.0  5.0 - 15.0 mmol/L Final   • WBC 05/12/2017 8.56  3.20 - 9.80 10*3/mm3 Final   • RBC 05/12/2017 4.83  4.37 - 5.74 10*6/mm3 Final   • Hemoglobin 05/12/2017 14.4  13.7 - 17.3 g/dL Final   • Hematocrit 05/12/2017 41.0  39.0 - 49.0 % Final   • MCV 05/12/2017 84.9  80.0 - 98.0 fL Final   • MCH 05/12/2017 29.8  26.5 - 34.0 pg Final   • MCHC 05/12/2017 35.1  31.5 - 36.3 g/dL Final   • RDW 05/12/2017 14.2  11.5 - 14.5 % Final   • RDW-SD 05/12/2017 44.4* 35.1 - 43.9 fl Final   • MPV 05/12/2017 11.8  8.0 - 12.0 fL Final   • Platelets 05/12/2017 178  150 - 450 10*3/mm3 Final   • Neutrophil % 05/12/2017 62.4  37.0 - 80.0 % Final   • Lymphocyte % 05/12/2017 25.0  10.0 - 50.0 % Final   • Monocyte % 05/12/2017 10.0  0.0 - 12.0 % Final   • Eosinophil % 05/12/2017 2.2  0.0 - 7.0 % Final   • Basophil % 05/12/2017 0.2  0.0 - 2.0 % Final   • Immature Grans % 05/12/2017 0.2  0.0 - 0.5 % Final   • Neutrophils, Absolute 05/12/2017 5.33  2.00 - 8.60 10*3/mm3 Final   • Lymphocytes, Absolute 05/12/2017 2.14  0.60 - 4.20 10*3/mm3 Final   • Monocytes, Absolute 05/12/2017 0.86  0.00 - 0.90 10*3/mm3 Final   • Eosinophils, Absolute 05/12/2017 0.19  0.00 - 0.70 10*3/mm3 Final   • Basophils, Absolute 05/12/2017 0.02  0.00 - 0.20 10*3/mm3 Final   • Immature Grans, Absolute 05/12/2017 0.02  0.00 - 0.02 10*3/mm3 Final      XR  "Shoulder 2+ View Right  Narrative: Patient Name:  SAUL CHRISTINE  Patient ID:  [id number]   Ordering:  ESTEPHANIA FERRARI  Attending:  ESTEPHANIA FERRARI  Referring:  ESETPHANIA FERRARI  ------------------------------------------------    Three view right shoulder    HISTORY: Right shoulder pain    AP films with the humerus in internal and external rotation and  scapular Y view were obtained.    COMPARISON: None    Calcific tendinitis or bursitis.  No fracture or dislocation.  No other osseous or articular abnormality.  Impression: CONCLUSION:  Calcific tendinitis or bursitis.    [id number]    Electronically signed by:  Joshua Willett MD  3/6/2017 5:05 PM [employer]  Workstation: [email or web address]    [email or web address]  Immunization History   Administered Date(s) Administered   • Tetanus 01/01/2012   • influenza Split 01/01/2015       The following portions of the patient's history were reviewed and updated as appropriate: allergies, current medications, past family history, past medical history, past social history, past surgical history and problem list.        Physical Exam  /76 (BP Location: Left arm, Patient Position: Sitting, Cuff Size: Adult)   Pulse 78   Temp 98.1 °F (36.7 °C) (Oral)   Ht 167.6 cm (66\")   Wt 83.8 kg (184 lb 12.8 oz)   SpO2 99%   BMI 29.83 kg/m²     Physical Exam   Constitutional: He is oriented to person, place, and time. He appears well-developed and well-nourished.   HENT:   Head: Normocephalic and atraumatic.   Right Ear: External ear normal.   Eyes: Pupils are equal, round, and reactive to light. Conjunctivae and EOM are normal.   Neck: Normal range of motion. Neck supple.   Cardiovascular: Normal rate, regular rhythm and normal heart sounds.   No murmur heard.  Pulmonary/Chest: Effort normal and breath sounds normal. No respiratory distress.   Abdominal: Soft. Bowel sounds are normal. He exhibits no distension. There is no tenderness.   Musculoskeletal: He exhibits tenderness. He exhibits no edema or " deformity.        Lumbar back: He exhibits decreased range of motion, tenderness, bony tenderness, pain and spasm.   Neurological: He is alert and oriented to person, place, and time. No cranial nerve deficit.   Skin: Skin is warm. No rash noted. He is not diaphoretic. No erythema. No pallor.   Psychiatric: He has a normal mood and affect. His behavior is normal.   Nursing note and vitals reviewed.      Assessment/Plan    Diagnosis Plan   1. Right-sided low back pain with right-sided sciatica, unspecified chronicity  XR Spine Lumbar Complete 4+VW    Ambulatory Referral to Physical Therapy Evaluate and treat   2. Mixed hyperlipidemia     3. Essential hypertension     4. General medical examination  CBC Auto Differential    Comprehensive Metabolic Panel    Hemoglobin A1c    Lipid Panel    TSH    T4, Free    T3, Free    Vitamin D 25 Hydroxy   5. Annual physical exam     6. Encounter for screening for endocrine disorder     7. Encounter for screening for diabetes mellitus             -recommend labwork  -annual physical exam today   - back pain -- recommend PT/OT. X-ray of lumbar spine flexeril  -back pain - x-ray of lumbar spine - recommend PT/OT   -HTN -lisionpril 40 mgdaily.  Stop  HCTZ 12.5 mg daily.  Start Chlorthalidone 25 mg daily.   -HLP - lipitor 40 mg PO qhs   -advised pt to go to ER or call 911 if symptoms worrisome or severe  -advised pt to followup with specialist and referrals   -advised pt to be safe and call with questions and concerns   -recheck in 6 weeks         This document has been electronically signed by Manuel Andersen MD on January 30, 2020 2:56 PM

## 2020-01-30 ENCOUNTER — OFFICE VISIT (OUTPATIENT)
Dept: FAMILY MEDICINE CLINIC | Facility: CLINIC | Age: 60
End: 2020-01-30

## 2020-01-30 VITALS
DIASTOLIC BLOOD PRESSURE: 76 MMHG | HEIGHT: 66 IN | TEMPERATURE: 98.1 F | SYSTOLIC BLOOD PRESSURE: 138 MMHG | BODY MASS INDEX: 29.7 KG/M2 | HEART RATE: 78 BPM | WEIGHT: 184.8 LBS | OXYGEN SATURATION: 99 %

## 2020-01-30 DIAGNOSIS — Z00.00 GENERAL MEDICAL EXAMINATION: ICD-10-CM

## 2020-01-30 DIAGNOSIS — M54.41 RIGHT-SIDED LOW BACK PAIN WITH RIGHT-SIDED SCIATICA, UNSPECIFIED CHRONICITY: Primary | ICD-10-CM

## 2020-01-30 DIAGNOSIS — Z13.29 ENCOUNTER FOR SCREENING FOR ENDOCRINE DISORDER: ICD-10-CM

## 2020-01-30 DIAGNOSIS — Z00.00 ANNUAL PHYSICAL EXAM: ICD-10-CM

## 2020-01-30 DIAGNOSIS — I10 ESSENTIAL HYPERTENSION: ICD-10-CM

## 2020-01-30 DIAGNOSIS — Z13.1 ENCOUNTER FOR SCREENING FOR DIABETES MELLITUS: ICD-10-CM

## 2020-01-30 DIAGNOSIS — E78.2 MIXED HYPERLIPIDEMIA: ICD-10-CM

## 2020-01-30 PROCEDURE — 99214 OFFICE O/P EST MOD 30 MIN: CPT | Performed by: FAMILY MEDICINE

## 2020-01-30 RX ORDER — CYCLOBENZAPRINE HCL 10 MG
10 TABLET ORAL NIGHTLY PRN
Qty: 30 TABLET | Refills: 3 | Status: SHIPPED | OUTPATIENT
Start: 2020-01-30 | End: 2020-06-30

## 2020-01-30 RX ORDER — CHLORTHALIDONE 25 MG/1
25 TABLET ORAL DAILY
Qty: 30 TABLET | Refills: 3 | Status: SHIPPED | OUTPATIENT
Start: 2020-01-30 | End: 2020-06-16 | Stop reason: SDUPTHER

## 2020-01-30 NOTE — PATIENT INSTRUCTIONS
Get labwork    Get x-ray    Stop HCTZ water pill    Start on chlorthalidone 25 mg daily once a day    Try physical theapy    Recheck in 6 weeks.  Chlorthalidone tablets  What is this medicine?  CHLORTHALIDONE (klor THAL i done) is a diuretic. It increases the amount of urine passed, which causes the body to lose salt and water. This medicine is used to treat high blood pressure and edema or water retention.  This medicine may be used for other purposes; ask your health care provider or pharmacist if you have questions.  COMMON BRAND NAME(S): Thalitone  What should I tell my health care provider before I take this medicine?  They need to know if you have any of these conditions:  -asthma  -diabetes  -gout  -kidney disease  -liver disease  -parathyroid disease  -systemic lupus erythematosus (SLE)  -taking cortisone, digoxin, lithium carbonate, or drugs for diabetes  -an unusual or allergic reaction to chlorthalidone, sulfa drugs, other medicines, foods, dyes, or preservatives  -pregnant or trying to get pregnant  -breast-feeding  How should I use this medicine?  Take this medicine by mouth with a glass of water. Follow the directions on the prescription label. It is best to take your dose in the morning with food. Take your medicine at regular intervals. Do not take your medicine more often than directed. Do not stop taking except on your doctor's advice.  Talk to your pediatrician regarding the use of this medicine in children. Special care may be needed.  Overdosage: If you think you have taken too much of this medicine contact a poison control center or emergency room at once.  NOTE: This medicine is only for you. Do not share this medicine with others.  What if I miss a dose?  If you miss a dose, take it as soon as you can. If it is almost time for your next dose, take only that dose. Do not take double or extra doses.  What may interact with this medicine?  -barbiturate medicines for sleep or seizure  control  -digoxin  -lithium  -medicines for diabetes  -norepinephrine  -other medicines for high blood pressure  -some pain medicines  -steroid hormones like prednisone, cortisone, hydrocortisone, corticotropin  -tubocurarine  This list may not describe all possible interactions. Give your health care provider a list of all the medicines, herbs, non-prescription drugs, or dietary supplements you use. Also tell them if you smoke, drink alcohol, or use illegal drugs. Some items may interact with your medicine.  What should I watch for while using this medicine?  Visit your doctor or health care professional for regular check ups. Check your blood pressure as directed. Ask your doctor or health care professional what your blood pressure should be and when you should contact him or her.  You may need to be on a special diet while taking this medicine. Ask your doctor.  You may get drowsy or dizzy. Do not drive, use machinery, or do anything that needs mental alertness until you know how this medicine affects you. Do not stand or sit up quickly, especially if you are an older patient. This reduces the risk of dizzy or fainting spells. Alcohol may interfere with the effect of this medicine. Avoid alcoholic drinks.  This medicine may affect your blood sugar level. If you have diabetes, check with your doctor or health care professional before changing the dose of your diabetic medicine.  This medicine can make you more sensitive to the sun. Keep out of the sun. If you cannot avoid being in the sun, wear protective clothing and use sunscreen. Do not use sun lamps or tanning beds/booths.  What side effects may I notice from receiving this medicine?  Side effects that you should report to your doctor or health care professional as soon as possible:  -allergic reactions like skin rash, itching or hives, swelling of the face, lips, or tongue  -dark urine  -dry mouth  -excess thirst  -fast, irregular heart rate  -fever,  chills  -muscle pain, cramps, or spasm  -nausea, vomiting  -redness, blistering, peeling or loosening of the skin, including inside the mouth  -tingling, pain or numbness in the hands or feet  -unusually weak or tired  -yellowing of the eyes or skin  Side effects that usually do not require medical attention (report to your doctor or health care professional if they continue or are bothersome):  -diarrhea or constipation  -headache  -impotence  -loss of appetite  -stomach upset  This list may not describe all possible side effects. Call your doctor for medical advice about side effects. You may report side effects to FDA at 6-552-EQT-5766.  Where should I keep my medicine?  Keep out of the reach of children.  Store at room temperature between 15 and 30 degrees C (59 and 86 degrees F). Keep container tightly closed. Throw away any unused medicine after the expiration date.  NOTE: This sheet is a summary. It may not cover all possible information. If you have questions about this medicine, talk to your doctor, pharmacist, or health care provider.  © 2019 [employer] (2009-03-24 15:28:48)

## 2020-01-31 ENCOUNTER — LAB (OUTPATIENT)
Dept: LAB | Facility: HOSPITAL | Age: 60
End: 2020-01-31

## 2020-01-31 DIAGNOSIS — Z00.00 GENERAL MEDICAL EXAMINATION: ICD-10-CM

## 2020-01-31 LAB
25(OH)D3 SERPL-MCNC: 14.4 NG/ML (ref 30–100)
ALBUMIN SERPL-MCNC: 4.2 G/DL (ref 3.5–5.2)
ALBUMIN/GLOB SERPL: 1.2 G/DL
ALP SERPL-CCNC: 48 U/L (ref 39–117)
ALT SERPL W P-5'-P-CCNC: 27 U/L (ref 1–41)
ANION GAP SERPL CALCULATED.3IONS-SCNC: 13.4 MMOL/L (ref 5–15)
AST SERPL-CCNC: 25 U/L (ref 1–40)
BASOPHILS # BLD AUTO: 0.03 10*3/MM3 (ref 0–0.2)
BASOPHILS NFR BLD AUTO: 0.3 % (ref 0–1.5)
BILIRUB SERPL-MCNC: 0.8 MG/DL (ref 0.2–1.2)
BUN BLD-MCNC: 15 MG/DL (ref 8–23)
BUN/CREAT SERPL: 12.2 (ref 7–25)
CALCIUM SPEC-SCNC: 9.2 MG/DL (ref 8.6–10.5)
CHLORIDE SERPL-SCNC: 101 MMOL/L (ref 98–107)
CHOLEST SERPL-MCNC: 160 MG/DL (ref 0–200)
CO2 SERPL-SCNC: 25.6 MMOL/L (ref 22–29)
CREAT BLD-MCNC: 1.23 MG/DL (ref 0.76–1.27)
DEPRECATED RDW RBC AUTO: 41.8 FL (ref 37–54)
EOSINOPHIL # BLD AUTO: 0.08 10*3/MM3 (ref 0–0.4)
EOSINOPHIL NFR BLD AUTO: 0.8 % (ref 0.3–6.2)
ERYTHROCYTE [DISTWIDTH] IN BLOOD BY AUTOMATED COUNT: 13.7 % (ref 12.3–15.4)
GFR SERPL CREATININE-BSD FRML MDRD: 72 ML/MIN/1.73
GLOBULIN UR ELPH-MCNC: 3.5 GM/DL
GLUCOSE BLD-MCNC: 99 MG/DL (ref 65–99)
HBA1C MFR BLD: 5.73 % (ref 4.8–5.6)
HCT VFR BLD AUTO: 43.9 % (ref 37.5–51)
HDLC SERPL-MCNC: 44 MG/DL (ref 40–60)
HGB BLD-MCNC: 14.9 G/DL (ref 13–17.7)
IMM GRANULOCYTES # BLD AUTO: 0.03 10*3/MM3 (ref 0–0.05)
IMM GRANULOCYTES NFR BLD AUTO: 0.3 % (ref 0–0.5)
LDLC SERPL CALC-MCNC: 103 MG/DL (ref 0–100)
LDLC/HDLC SERPL: 2.33 {RATIO}
LYMPHOCYTES # BLD AUTO: 1.66 10*3/MM3 (ref 0.7–3.1)
LYMPHOCYTES NFR BLD AUTO: 17 % (ref 19.6–45.3)
MCH RBC QN AUTO: 29 PG (ref 26.6–33)
MCHC RBC AUTO-ENTMCNC: 33.9 G/DL (ref 31.5–35.7)
MCV RBC AUTO: 85.6 FL (ref 79–97)
MONOCYTES # BLD AUTO: 0.65 10*3/MM3 (ref 0.1–0.9)
MONOCYTES NFR BLD AUTO: 6.7 % (ref 5–12)
NEUTROPHILS # BLD AUTO: 7.3 10*3/MM3 (ref 1.7–7)
NEUTROPHILS NFR BLD AUTO: 74.9 % (ref 42.7–76)
NRBC BLD AUTO-RTO: 0 /100 WBC (ref 0–0.2)
PLATELET # BLD AUTO: 215 10*3/MM3 (ref 140–450)
PMV BLD AUTO: 12 FL (ref 6–12)
POTASSIUM BLD-SCNC: 4.3 MMOL/L (ref 3.5–5.2)
PROT SERPL-MCNC: 7.7 G/DL (ref 6–8.5)
RBC # BLD AUTO: 5.13 10*6/MM3 (ref 4.14–5.8)
SODIUM BLD-SCNC: 140 MMOL/L (ref 136–145)
T3FREE SERPL-MCNC: 3.25 PG/ML (ref 2–4.4)
T4 FREE SERPL-MCNC: 1.14 NG/DL (ref 0.93–1.7)
TRIGL SERPL-MCNC: 67 MG/DL (ref 0–150)
TSH SERPL DL<=0.05 MIU/L-ACNC: 0.8 UIU/ML (ref 0.27–4.2)
VLDLC SERPL-MCNC: 13.4 MG/DL (ref 5–40)
WBC NRBC COR # BLD: 9.75 10*3/MM3 (ref 3.4–10.8)

## 2020-01-31 PROCEDURE — 83036 HEMOGLOBIN GLYCOSYLATED A1C: CPT

## 2020-01-31 PROCEDURE — 85025 COMPLETE CBC W/AUTO DIFF WBC: CPT

## 2020-01-31 PROCEDURE — 80061 LIPID PANEL: CPT

## 2020-01-31 PROCEDURE — 80053 COMPREHEN METABOLIC PANEL: CPT

## 2020-01-31 PROCEDURE — 82306 VITAMIN D 25 HYDROXY: CPT

## 2020-01-31 PROCEDURE — 84481 FREE ASSAY (FT-3): CPT

## 2020-01-31 PROCEDURE — 84439 ASSAY OF FREE THYROXINE: CPT

## 2020-01-31 PROCEDURE — 84443 ASSAY THYROID STIM HORMONE: CPT

## 2020-02-04 ENCOUNTER — TELEPHONE (OUTPATIENT)
Dept: FAMILY MEDICINE CLINIC | Facility: CLINIC | Age: 60
End: 2020-02-04

## 2020-02-04 RX ORDER — CHOLECALCIFEROL (VITAMIN D3) 1250 MCG
50000 CAPSULE ORAL
Qty: 4 CAPSULE | Refills: 3 | Status: SHIPPED | OUTPATIENT
Start: 2020-02-04 | End: 2020-06-30

## 2020-02-04 NOTE — TELEPHONE ENCOUNTER
----- Message from Manuel Andersen MD sent at 1/31/2020 12:56 PM CST -----  Please call pt x-ray of lumbar spine shows mild degenerative changes.    Continue and proceed with PT/OT    Recheck on next visit. Thanks

## 2020-02-04 NOTE — TELEPHONE ENCOUNTER
----- Message from Manuel Andersen MD sent at 2/1/2020 12:53 PM CST -----  Please call pt Vitamin D levels low and recommend Vitamin D3 50,000 units once a week give 4 pills and 3 refills     Thyroid studies normal     On CMP kidney function shows GFR at 72. Pt has chronic kidney disease stage 2. Emphasis BP control and more water intake     Liver function normal     On lipid panel LDL slightly high. Recommend heart healthy diet more vegetables and lean protein.  Weight loss and exercise recommended. Also continue lipitor     CBC shows normal hemoglobin    hga1c shows pt is prediabetic at 5.73. Recommend watching sugar and carb intake.    Recheck on next visit. Thanks

## 2020-02-12 ENCOUNTER — TREATMENT (OUTPATIENT)
Dept: PHYSICAL THERAPY | Facility: CLINIC | Age: 60
End: 2020-02-12

## 2020-02-12 DIAGNOSIS — M54.41 RIGHT-SIDED LOW BACK PAIN WITH RIGHT-SIDED SCIATICA, UNSPECIFIED CHRONICITY: Primary | ICD-10-CM

## 2020-02-12 PROCEDURE — 97110 THERAPEUTIC EXERCISES: CPT | Performed by: PHYSICAL THERAPIST

## 2020-02-12 PROCEDURE — 97161 PT EVAL LOW COMPLEX 20 MIN: CPT | Performed by: PHYSICAL THERAPIST

## 2020-02-12 NOTE — PROGRESS NOTES
Physical Therapy Initial Evaluation and Plan of Care      Patient: Amos Miranda   : 1960  Diagnosis/ICD-10 Code:  Right-sided low back pain with right-sided sciatica, unspecified chronicity [M54.41]  Referring practitioner: Manuel Andersen MD  Date of Initial Visit: 2020  Today's Date: 2020  Patient seen for 1 sessions    Next MD appt: 2020.  Recertification: 2020           Subjective Questionnaire: Oswestry: 3/50 = 6%      Subjective Evaluation    History of Present Illness  Onset date: ~1 year, on/off.  Mechanism of injury: No injury that can recall.    Subjective comment: patient reports when he lifts he feels a pain down his R leg. He reports it is not a hurting pain but an annoying pain. He reports it will run down his R leg to his knee. he reports when he stops lifting it will ease back up and go away. No change with cough/sneeze.  Patient Occupation: [employer], production [employer]  Current pain ratin  At best pain ratin  At worst pain rating: 3  Location: R sided LB and down R LE  Quality: radiating and tight  Relieving factors: heat and change in position (stretches)  Aggravating factors: lifting (Jogging)    Social Support  Lives with: spouse    Diagnostic Tests  X-ray: normal (mild DJD)    Treatments  No previous or current treatments  Patient Goals  Patient goals for therapy: decreased pain  Patient goal: make sure it doesn't get worse.           Objective       Static Posture     Comments  Normal C-T-L curves.  Normal alignmed Scarum/Pelvis. No LLD to note.      Postural Observations  Seated posture: fair  Standing posture: good  Correction of posture: has no consistent effect        Palpation     Right Tenderness of the quadratus lumborum.     Neurological Testing     Sensation     Lumbar   Left   Intact: light touch and hot/cold discrimination    Right   Intact: light touch and hot/cold discrimination  Paresthesia: light touch    Reflexes   Left   Patellar  (L4): normal (2+)  Achilles (S1): normal (2+)    Right   Patellar (L4): normal (2+)  Achilles (S1): normal (2+)    Active Range of Motion     Lumbar   Flexion: 110 degrees   Extension: 30 degrees   Left lateral flexion: WFL  Right lateral flexion: WFL  Left rotation: Active left lumbar rotation: 75% of range.   Right rotation: Active right lumbar rotation: 75% of range.     Tests       Thoracic   Negative slump.     Lumbar     Left   Negative crossed SLR, passive SLR, quadrant and valsalva.     Right   Negative crossed SLR, passive SLR, quadrant and valsalva.     Left Pelvic Girdle/Sacrum   Negative: sacrum compression, gapping and sacral spring.     Right Pelvic Girdle/Sacrum   Negative: sacrum compression, gapping and sacral spring.     Left Hip   Negative DEXTER, FADIR, piriformis, SI compression and SI distraction.   SLR: Negative.     Right Hip   Negative DEXTER, FADIR, piriformis, SI compression and SI distraction.   SLR: Negative.     Additional Tests Details  Lorne's Symptoms  1) Tenderness- negative  2) Simulation- negative  3) Distraction- negative  4) Regional- negative  5) Overreaction- negative    Ambulation     Comments   FWB, non-antalgic gait, no distress, no assistive device, no significant gait deviation, normal arm swing with gait.         Assessment & Plan     Assessment  Impairments: abnormal gait, abnormal or restricted ROM, activity intolerance, lacks appropriate home exercise program and pain with function  Assessment details: Patient presents with chronic low back pain on/off with some on/off radicular pain. Patient has mild DJD on x-ray. Also has some mild muscle tightness and would benefit from instruction in HEP for spinal protection and stabilization.  Prognosis: fair  Prognosis details: Barriers to Rehab:Include significant or possible arthritic/degenerative changes that have occurred within the spine, The chronicity of this issue.    Safety Issues: None noted.    Functional  "Limitations: carrying objects, lifting, sleeping, uncomfortable because of pain, sitting, standing and unable to perform repetitive tasks  Goals  Plan Goals: Short Term Goals:  1) I with HEP and have additions/changes by next recertification    2) Patient able to show proper log roll technique.    3) Patient to be more aware of posture and posture correction techniques    4) AROm B Lumbar ROT WNL.    5) Patient able to perform 20 Bridges with UE \"X\" over Pball with no increase in pain.        Long Term Goals:  1) Patient to have AROM for the lumbar spine all WNL, no increase in pain.    2) Patient able to perform prone planks 10\" hold x10 reps.    3) Patient able to perform B SL planks 10\" hold x5 reps each side.    4) Patient able to show proper lifting technique floor to waist with no increase in pain.    5) Patient able to show proper ergonomics for sweeping.    6) I with final HEP.    7) D/C with a final HEP and free 30 day fitness formula membership.      Plan  Therapy options: will be seen for skilled physical therapy services  Planned modality interventions: cryotherapy, high voltage pulsed current (pain management) and thermotherapy (hydrocollator packs)  Planned therapy interventions: abdominal trunk stabilization, body mechanics training, flexibility, functional ROM exercises, home exercise program, transfer training, therapeutic activities, stretching, strengthening, spinal/joint mobilization, soft tissue mobilization, postural training and manual therapy  Duration in visits: 10  Treatment plan discussed with: patient  Plan details: Progress ROM, strength, core stab, body mechanics to an I HEP that the patient can continue for long term pain management and spinal protection.    Other therapeutic activities and/or exercises will be prescribed depending on the patients progress or lack there of.    Visit Diagnoses:    ICD-10-CM ICD-9-CM   1. Right-sided low back pain with right-sided sciatica, unspecified " chronicity M54.41 724.3       Timed:  Manual Therapy:         mins  29789;  Therapeutic Exercise:    30     mins  75649;     Neuromuscular Ragini:        mins  21083;    Therapeutic Activity:          mins  45314;     Gait Training:           mins  98916;     Ultrasound:          mins  73550;    Electrical Stimulation:         mins  47518 ( );    Untimed:  Electrical Stimulation:         mins  21670 ( );  Mechanical Traction:         mins  17606;     Timed Treatment:   30   mins   Total Treatment:     46   mins    PT SIGNATURE: Lakeisha Kuhn, PT DPT, [facility]   DATE TREATMENT INITIATED: 2/12/2020    Initial Certification  Certification Period: 5/12/2020  I certify that the therapy services are furnished while this patient is under my care.  The services outlined above are required by this patient, and will be reviewed every 90 days.     PHYSICIAN: Manuel Andersen MD      DATE:     Please sign and return via fax to  .. Thank you, [facility].

## 2020-02-17 ENCOUNTER — TREATMENT (OUTPATIENT)
Dept: PHYSICAL THERAPY | Facility: CLINIC | Age: 60
End: 2020-02-17

## 2020-02-17 DIAGNOSIS — M54.41 RIGHT-SIDED LOW BACK PAIN WITH RIGHT-SIDED SCIATICA, UNSPECIFIED CHRONICITY: Primary | ICD-10-CM

## 2020-02-17 PROCEDURE — 97110 THERAPEUTIC EXERCISES: CPT | Performed by: PHYSICAL THERAPIST

## 2020-02-17 NOTE — PROGRESS NOTES
"Physical Therapy Daily Progress Note    Patient: Amos Miranda   : 1960  Diagnosis/ICD-10 Code:     Diagnosis Plan   1. Right-sided low back pain with right-sided sciatica, unspecified chronicity       Referring practitioner: Manuel Andersen MD  Date of Initial Visit: Type: THERAPY  Noted: 2020  Today's Date: 2020  Patient seen for 2 sessions      PT Recheck Due: 2020  PT MD Visit: 2020       Amos Miranda      Subjective Evaluation    History of Present Illness    Subjective comment: feeling about the same. some muscle soreness         Objective   See Exercise, Manual, and Modality Logs for complete treatment.       Assessment & Plan     Assessment  Assessment details: Patient able to complete bridges with arms across chest with no difficulty. Good effort throughout. Postural cueing required.     Goals  Plan Goals: Short Term Goals:  1) I with HEP and have additions/changes by next recertification    2) Patient able to show proper log roll technique.     3) Patient to be more aware of posture and posture correction techniques    4) AROm B Lumbar ROT WNL.    5) Patient able to perform 20 Bridges with UE \"X\" over Pball with no increase in pain. (met)        Long Term Goals:  1) Patient to have AROM for the lumbar spine all WNL, no increase in pain.    2) Patient able to perform prone planks 10\" hold x10 reps.    3) Patient able to perform B SL planks 10\" hold x5 reps each side.    4) Patient able to show proper lifting technique floor to waist with no increase in pain.    5) Patient able to show proper ergonomics for sweeping.    6) I with final HEP.    7) D/C with a final HEP and free 30 day fitness formula membership.    Plan  Plan details: Next visit Cyn disc seated core stability      Progress strengthening /stabilization /functional activity            Timed:  Manual Therapy:         mins  84171;  Therapeutic Exercise:    45     mins  84537;   Aquatic Therex :        " mins  42940    Neuromuscular Ragini:        mins  30416;    Therapeutic Activity:          mins  30846;     Gait Training:           mins  26864;     Ultrasound:          mins  35602;    Electrical Stimulation:         mins  16409 ( );    Untimed:  Electrical Stimulation:         mins  72138 ( );  Mechanical Traction:         mins  83987;   Orthotic fit/train:         mins  95098    Timed Treatment:   45   mins   Total Treatment:     45   mins  Janice Reynolds PTA  Physical Therapist Assistant

## 2020-02-20 ENCOUNTER — TREATMENT (OUTPATIENT)
Dept: PHYSICAL THERAPY | Facility: CLINIC | Age: 60
End: 2020-02-20

## 2020-02-20 DIAGNOSIS — M54.41 RIGHT-SIDED LOW BACK PAIN WITH RIGHT-SIDED SCIATICA, UNSPECIFIED CHRONICITY: Primary | ICD-10-CM

## 2020-02-20 PROCEDURE — 97110 THERAPEUTIC EXERCISES: CPT | Performed by: PHYSICAL THERAPIST

## 2020-02-20 NOTE — PROGRESS NOTES
"Physical Therapy Daily Progress Note    Patient: Amos Miranda   : 1960  Diagnosis/ICD-10 Code:     Diagnosis Plan   1. Right-sided low back pain with right-sided sciatica, unspecified chronicity       Referring practitioner: Manuel Andersen MD  Date of Initial Visit: Type: THERAPY  Noted: 2020  Today's Date: 2020  Patient seen for 3 sessions      PT Recheck Due: 2020  PT MD Visit: 2020       Amos Miranda      Subjective Evaluation    History of Present Illness    Subjective comment: states that he is about the same pain wise. have a little, not muchPain  Current pain ratin             Objective       Active Range of Motion     Lumbar   Left lateral flexion: WFL  Right lateral flexion: WFL  Left rotation: WFL  Right rotation: WFL     See Exercise, Manual, and Modality Logs for complete treatment.       Assessment & Plan     Assessment  Assessment details: Patient has asymmetrical alignment that is easily corrected with HS MET on R LE today. Patient reports feeling improved after correction. Patient is educated on removing wallet from back pocket when sitting down    Goals  Plan Goals: Short Term Goals:  1) I with HEP and have additions/changes by next recertification (met)    2) Patient able to show proper log roll technique.      3) Patient to be more aware of posture and posture correction techniques (met)    4) AROm B Lumbar ROT WNL. (met)    5) Patient able to perform 20 Bridges with UE \"X\" over Pball with no increase in pain. (met)        Long Term Goals:  1) Patient to have AROM for the lumbar spine all WNL, no increase in pain.    2) Patient able to perform prone planks 10\" hold x10 reps.    3) Patient able to perform B SL planks 10\" hold x5 reps each side.    4) Patient able to show proper lifting technique floor to waist with no increase in pain.    5) Patient able to show proper ergonomics for sweeping.    6) I with final HEP.    7) D/C with a final HEP and " free 30 day fitness formula membership.    Plan  Plan details: Continue to monitor alignment. Also may attempt bridges with arms crossed chest over physioball as well as planks      Progress strengthening /stabilization /functional activity            Timed:  Manual Therapy:         mins  34931;  Therapeutic Exercise:    45     mins  66421;   Aquatic Therex :        mins  15945    Neuromuscular Ragini:        mins  57059;    Therapeutic Activity:          mins  08289;     Gait Training:           mins  34343;     Ultrasound:          mins  61066;    Electrical Stimulation:         mins  87808 ( );    Untimed:  Electrical Stimulation:         mins  66672 ( );  Mechanical Traction:         mins  95763;   Orthotic fit/train:         mins  53664    Timed Treatment:   45   mins   Total Treatment:     45   mins  Janice Reynolds PTA  Physical Therapist Assistant

## 2020-02-24 ENCOUNTER — TREATMENT (OUTPATIENT)
Dept: PHYSICAL THERAPY | Facility: CLINIC | Age: 60
End: 2020-02-24

## 2020-02-24 DIAGNOSIS — M54.41 RIGHT-SIDED LOW BACK PAIN WITH RIGHT-SIDED SCIATICA, UNSPECIFIED CHRONICITY: Primary | ICD-10-CM

## 2020-02-24 PROCEDURE — 97110 THERAPEUTIC EXERCISES: CPT | Performed by: PHYSICAL THERAPIST

## 2020-02-24 NOTE — PROGRESS NOTES
"Physical Therapy Daily Progress Note    Patient: Amos Miranda   : 1960  Diagnosis/ICD-10 Code:     Diagnosis Plan   1. Right-sided low back pain with right-sided sciatica, unspecified chronicity       Referring practitioner: Manuel Andersen MD  Date of Initial Visit: Type: THERAPY  Noted: 2020  Today's Date: 2020  Patient seen for 4 sessions      PT Recheck Due: 2020  PT MD Visit: 2020       Amos Miranda reports: 98% improvement overall      Subjective Questionnaire: Oswestry: 3/50 = 6%      Subjective Evaluation    History of Present Illness    Subjective comment: work made accomodations and got a die cart at work so he no longer has to lift the bigger parts when changing out          Objective       Active Range of Motion     Lumbar   Flexion: 110 degrees   Extension: 30 degrees   Left lateral flexion: WFL  Right lateral flexion: WFL  Left rotation: WFL  Right rotation: WFL     See Exercise, Manual, and Modality Logs for complete treatment.       Assessment & Plan     Assessment  Assessment details: Patient has improved AROM. Patient is more posturaly aware at this time. Demonstrates an appropriate log roll technique. Is able to complete prone planks 10x10 sec hold. Is able to complete Side Planks 10x5 sec hold. Good effort throughout. Patient reports no significant pain or difficulties.     Goals  Plan Goals: Short Term Goals:  1) I with HEP and have additions/changes by next recertification (met)    2) Patient able to show proper log roll technique. (met)    3) Patient to be more aware of posture and posture correction techniques (met)    4) AROm B Lumbar ROT WNL. (met)    5) Patient able to perform 20 Bridges with UE \"X\" over Pball with no increase in pain. (met)        Long Term Goals:  1) Patient to have AROM for the lumbar spine all WNL, no increase in pain.    2) Patient able to perform prone planks 10\" hold x10 reps. (met)    3) Patient able to perform B SL " "planks 10\" hold x5 reps each side. (met)    4) Patient able to show proper lifting technique floor to waist with no increase in pain.    5) Patient able to show proper ergonomics for sweeping.    6) I with final HEP.    7) D/C with a final HEP and free 30 day fitness formula membership.    Plan  Plan details: Review ergonomics and lifting technique. Begin preparing for discharge once goals met. If goals met next visit may discharge at that time.       Progress strengthening /stabilization /functional activity            Timed:  Manual Therapy:         mins  91766;  Therapeutic Exercise:    45     mins  73708;   Aquatic Therex :        mins  43826    Neuromuscular Ragini:        mins  80241;    Therapeutic Activity:          mins  99465;     Gait Training:           mins  42281;     Ultrasound:          mins  94129;    Electrical Stimulation:         mins  89550 ( );    Untimed:  Electrical Stimulation:         mins  03735 ( );  Mechanical Traction:         mins  14202;   Orthotic fit/train:         mins  77605    Timed Treatment:   45   mins   Total Treatment:     45   mins  Janice Reynolds PTA  Physical Therapist Assistant  "

## 2020-02-26 ENCOUNTER — TREATMENT (OUTPATIENT)
Dept: PHYSICAL THERAPY | Facility: CLINIC | Age: 60
End: 2020-02-26

## 2020-02-26 DIAGNOSIS — M54.41 RIGHT-SIDED LOW BACK PAIN WITH RIGHT-SIDED SCIATICA, UNSPECIFIED CHRONICITY: Primary | ICD-10-CM

## 2020-02-26 PROCEDURE — 97110 THERAPEUTIC EXERCISES: CPT | Performed by: PHYSICAL THERAPIST

## 2020-02-26 NOTE — PROGRESS NOTES
"Physical Therapy Daily Progress Note/Discharge    Patient: Amos Miranda   : 1960  Diagnosis/ICD-10 Code:     Diagnosis Plan   1. Right-sided low back pain with right-sided sciatica, unspecified chronicity       Referring practitioner: Manuel Andersen MD  Date of Initial Visit: Type: THERAPY  Noted: 2020  Today's Date: 2020  Patient seen for 5 sessions      PT Recheck Due: 2020  PT MD Visit: 2020       Amos Miranda reports: 100% improvement         Subjective       Objective       Active Range of Motion     Lumbar   Flexion: 124 degrees   Extension: 35 degrees   Left lateral flexion: WFL  Right lateral flexion: WFL  Left rotation: WFL  Right rotation: WFL     See Exercise, Manual, and Modality Logs for complete treatment.       Assessment & Plan     Assessment  Assessment details: Patient has met all goals. Demonstrates independence in all HEP and has good body mechanics with lifting and good ergonomics with household chores. Overall improved AROM     Goals  Plan Goals: Short Term Goals:  1) I with HEP and have additions/changes by next recertification (met)    2) Patient able to show proper log roll technique. (met)    3) Patient to be more aware of posture and posture correction techniques (met)    4) AROm B Lumbar ROT WNL. (met)    5) Patient able to perform 20 Bridges with UE \"X\" over Pball with no increase in pain. (met)        Long Term Goals:  1) Patient to have AROM for the lumbar spine all WNL, no increase in pain. (met)    2) Patient able to perform prone planks 10\" hold x10 reps. (met)    3) Patient able to perform B SL planks 10\" hold x5 reps each side. (met)    4) Patient able to show proper lifting technique floor to waist with no increase in pain. (met)    5) Patient able to show proper ergonomics for sweeping. (met)    6) I with final HEP. (met)    7) D/C with a final HEP and free 30 day fitness formula membership. (met)    Plan  Plan details: Discharge to " independent [facility] with free 30 days fitness                  Timed:  Manual Therapy:         mins  74977;  Therapeutic Exercise:    45     mins  87714;   Aquatic Therex :        mins  92876    Neuromuscular Ragini:        mins  25722;    Therapeutic Activity:          mins  51158;     Gait Training:           mins  40695;     Ultrasound:          mins  55136;    Electrical Stimulation:         mins  91716 ( );    Untimed:  Electrical Stimulation:         mins  64731 ( );  Mechanical Traction:         mins  96366;   Orthotic fit/train:         mins  79836    Timed Treatment:   45   mins   Total Treatment:     45   mins  Janice Reynolds PTA  Physical Therapist Assistant

## 2020-03-09 RX ORDER — LISINOPRIL 40 MG/1
40 TABLET ORAL DAILY
Qty: 30 TABLET | Refills: 0 | Status: SHIPPED | OUTPATIENT
Start: 2020-03-09 | End: 2020-05-08 | Stop reason: SDUPTHER

## 2020-03-12 PROBLEM — R73.03 PREDIABETES: Status: ACTIVE | Noted: 2020-03-12

## 2020-03-12 PROBLEM — M47.816 ARTHRITIS, LUMBAR SPINE: Status: ACTIVE | Noted: 2020-03-12

## 2020-03-13 RX ORDER — LISINOPRIL 40 MG/1
40 TABLET ORAL DAILY
Qty: 30 TABLET | Refills: 0 | Status: CANCELLED | OUTPATIENT
Start: 2020-03-13

## 2020-03-13 NOTE — TELEPHONE ENCOUNTER
Patient called in requesting a refill on his lisinopril (PRINIVIL,ZESTRIL) 40 MG tablet medication and states he took his last tablet last Wednesday. Please send refill to [id number] - [place], [place] - [address] [facility] AT [address]    For any questions or issues, please call patient back at [phone]

## 2020-04-02 ENCOUNTER — TELEPHONE (OUTPATIENT)
Dept: FAMILY MEDICINE CLINIC | Facility: CLINIC | Age: 60
End: 2020-04-02

## 2020-04-02 RX ORDER — TADALAFIL 10 MG/1
TABLET ORAL
Qty: 10 TABLET | Refills: 3 | Status: SHIPPED | OUTPATIENT
Start: 2020-04-02 | End: 2021-01-19 | Stop reason: CLARIF

## 2020-04-02 NOTE — TELEPHONE ENCOUNTER
Sent medication to pharmacy. Pt can only take every 72 hours as needed for ED.  Effect may last for 72 hours.   Sent 10 mg of cialis with 10 pills and 3 refills. Thanks

## 2020-04-02 NOTE — TELEPHONE ENCOUNTER
Patient called in requesting a script for CIALIS 10 MG tablet. He stated that he and  had discussed it before and is requesting if he is going to send it in then do it.     Please call at [phone]    Pharmacy confirmed.

## 2020-05-08 RX ORDER — LISINOPRIL 40 MG/1
40 TABLET ORAL DAILY
Qty: 30 TABLET | Refills: 0 | Status: SHIPPED | OUTPATIENT
Start: 2020-05-08 | End: 2020-06-16 | Stop reason: SDUPTHER

## 2020-05-08 NOTE — TELEPHONE ENCOUNTER
Patient called and requested a refill on his lisinopril (PRINIVIL,ZESTRIL) 40 MG tablet Rx.    Verified [facility] in [place].    Patient can be contacted at [phone].

## 2020-06-16 RX ORDER — LISINOPRIL 40 MG/1
40 TABLET ORAL DAILY
Qty: 30 TABLET | Refills: 3 | Status: SHIPPED | OUTPATIENT
Start: 2020-06-16 | End: 2021-04-15 | Stop reason: SDUPTHER

## 2020-06-16 RX ORDER — CHLORTHALIDONE 25 MG/1
25 TABLET ORAL DAILY
Qty: 30 TABLET | Refills: 3 | Status: SHIPPED | OUTPATIENT
Start: 2020-06-16 | End: 2021-04-15 | Stop reason: SDUPTHER

## 2020-06-23 NOTE — PROGRESS NOTES
Subjective:  Amos Miranda is a 60 y.o. male who presents for       Patient Active Problem List   Diagnosis   • Need for hepatitis C screening test   • Hyperlipidemia   • Essential hypertension   • Low back pain without sciatica   • Right shoulder pain   • Leukocytosis   • Bursitis, shoulder   • Cough   • Neutrophilia   • Renal impairment   • Upper respiratory tract infection   • Right-sided low back pain with right-sided sciatica   • Prediabetes   • Arthritis, lumbar spine   • Vitamin D deficiency           Current Outpatient Medications:   •  chlorthalidone (HYGROTON) 25 MG tablet, Take 1 tablet by mouth Daily., Disp: 30 tablet, Rfl: 3  •  lisinopril (PRINIVIL,ZESTRIL) 40 MG tablet, Take 1 tablet by mouth Daily., Disp: 30 tablet, Rfl: 3  •  RA ASPIRIN ADULT LOW STRENGTH 81 MG chewable tablet, chew and swallow 1 tablet by mouth once daily, Disp: 30 tablet, Rfl: 6  •  tadalafil (Cialis) 10 MG tablet, Take 1 tablet every 72 hours PRN as needed.  Effects may last up to 72 hours, Disp: 10 tablet, Rfl: 3    HPI     Pt is 60 yo male with management of HTN, HLP,  Right shoulder pain, low back pain with sciatica, ED,Vitamin D deficiency       1/30/20 pt is heref or recheck and followup. Have not seen pt since 2017. He is due for new labwork. He continues his BP medicaitons HCTZ 12.5 mg daily and lisionpril 40 mg daily. He takes lipiro and aspirin His main issues. He states back pain comes from lifting heavy objects at works. He may lift up to 60 lbs a day.      6/29/20 in office visit for recheck on pt's HTN , HLp,  Right shoudler pain.. Pt had labwork done on 1/31/20 that shwoed low vitmain D thyroid studies were normal lipid panel showed LDL at 103. hga1c at 5.73. CMP showed GFR at 72. And normal liver enzymes. CBC showed normal hemoglobin. Pt is due for new labwork. His BP is stable with chlorthalidone 25 mg dialy and lisinopril 40 mg dialy. He stopped tkiang vitamin D and lipitor 40 mg PO qhs. No major  concerns.  He is doing overall. No chest pain, no dizzinessn no shortness of air.  His back pain is beter with exercises at home and PT/OT            Hypertension   This is a chronic problem. The current episode started more than 1 year ago. The problem has been waxing and waning since onset. The problem is uncontrolled. Pertinent negatives include no anxiety, blurred vision, chest pain, headaches, malaise/fatigue, neck pain, orthopnea, palpitations, peripheral edema, PND, shortness of breath or sweats. Risk factors for coronary artery disease include dyslipidemia and male gender. Past treatments include ACE inhibitors and diuretics. Current antihypertension treatment includes diuretics and ACE inhibitors. The current treatment provides no improvement. There are no compliance problems.  There is no history of angina, kidney disease, CAD/MI, CVA, heart failure, left ventricular hypertrophy, PVD or retinopathy. There is no history of chronic renal disease, coarctation of the aorta, hyperaldosteronism, hypercortisolism, a hypertension causing med, pheochromocytoma, renovascular disease or sleep apnea.       Review of Systems  Review of Systems   Constitutional: Positive for activity change and fatigue. Negative for appetite change, chills, diaphoresis and fever.   HENT: Negative for congestion, postnasal drip, rhinorrhea, sinus pressure, sinus pain, sneezing, sore throat, trouble swallowing and voice change.    Respiratory: Negative for cough, choking, chest tightness, shortness of breath, wheezing and stridor.    Cardiovascular: Negative for chest pain.   Gastrointestinal: Negative for diarrhea, nausea and vomiting.   Musculoskeletal: Positive for arthralgias.   Neurological: Positive for weakness and numbness. Negative for headaches.       Patient Active Problem List   Diagnosis   • Need for hepatitis C screening test   • Hyperlipidemia   • Essential hypertension   • Low back pain without sciatica   • Right shoulder  pain   • Leukocytosis   • Bursitis, shoulder   • Cough   • Neutrophilia   • Renal impairment   • Upper respiratory tract infection   • Right-sided low back pain with right-sided sciatica   • Prediabetes   • Arthritis, lumbar spine   • Vitamin D deficiency     No past surgical history on file.  Social History     Socioeconomic History   • Marital status:      Spouse name: Not on file   • Number of children: Not on file   • Years of education: Not on file   • Highest education level: Not on file   Tobacco Use   • Smoking status: Never Smoker   • Smokeless tobacco: Never Used   Substance and Sexual Activity   • Alcohol use: No   • Drug use: Never     Family History   Problem Relation Age of Onset   • Heart disease Mother    • Cancer Father         lung   • Diabetes Other    • Hypertension Other    • Stroke Other      Lab on 01/31/2020   Component Date Value Ref Range Status   • WBC 01/31/2020 9.75  3.40 - 10.80 10*3/mm3 Final   • RBC 01/31/2020 5.13  4.14 - 5.80 10*6/mm3 Final   • Hemoglobin 01/31/2020 14.9  13.0 - 17.7 g/dL Final   • Hematocrit 01/31/2020 43.9  37.5 - 51.0 % Final   • MCV 01/31/2020 85.6  79.0 - 97.0 fL Final   • MCH 01/31/2020 29.0  26.6 - 33.0 pg Final   • MCHC 01/31/2020 33.9  31.5 - 35.7 g/dL Final   • RDW 01/31/2020 13.7  12.3 - 15.4 % Final   • RDW-SD 01/31/2020 41.8  37.0 - 54.0 fl Final   • MPV 01/31/2020 12.0  6.0 - 12.0 fL Final   • Platelets 01/31/2020 215  140 - 450 10*3/mm3 Final   • Neutrophil % 01/31/2020 74.9  42.7 - 76.0 % Final   • Lymphocyte % 01/31/2020 17.0* 19.6 - 45.3 % Final   • Monocyte % 01/31/2020 6.7  5.0 - 12.0 % Final   • Eosinophil % 01/31/2020 0.8  0.3 - 6.2 % Final   • Basophil % 01/31/2020 0.3  0.0 - 1.5 % Final   • Immature Grans % 01/31/2020 0.3  0.0 - 0.5 % Final   • Neutrophils, Absolute 01/31/2020 7.30* 1.70 - 7.00 10*3/mm3 Final   • Lymphocytes, Absolute 01/31/2020 1.66  0.70 - 3.10 10*3/mm3 Final   • Monocytes, Absolute 01/31/2020 0.65  0.10 - 0.90  10*3/mm3 Final   • Eosinophils, Absolute 01/31/2020 0.08  0.00 - 0.40 10*3/mm3 Final   • Basophils, Absolute 01/31/2020 0.03  0.00 - 0.20 10*3/mm3 Final   • Immature Grans, Absolute 01/31/2020 0.03  0.00 - 0.05 10*3/mm3 Final   • nRBC 01/31/2020 0.0  0.0 - 0.2 /100 WBC Final   • Glucose 01/31/2020 99  65 - 99 mg/dL Final   • BUN 01/31/2020 15  8 - 23 mg/dL Final   • Creatinine 01/31/2020 1.23  0.76 - 1.27 mg/dL Final   • Sodium 01/31/2020 140  136 - 145 mmol/L Final   • Potassium 01/31/2020 4.3  3.5 - 5.2 mmol/L Final   • Chloride 01/31/2020 101  98 - 107 mmol/L Final   • CO2 01/31/2020 25.6  22.0 - 29.0 mmol/L Final   • Calcium 01/31/2020 9.2  8.6 - 10.5 mg/dL Final   • Total Protein 01/31/2020 7.7  6.0 - 8.5 g/dL Final   • Albumin 01/31/2020 4.20  3.50 - 5.20 g/dL Final   • ALT (SGPT) 01/31/2020 27  1 - 41 U/L Final   • AST (SGOT) 01/31/2020 25  1 - 40 U/L Final   • Alkaline Phosphatase 01/31/2020 48  39 - 117 U/L Final   • Total Bilirubin 01/31/2020 0.8  0.2 - 1.2 mg/dL Final   • eGFR   Amer 01/31/2020 72  >60 mL/min/1.73 Final   • Globulin 01/31/2020 3.5  gm/dL Final   • A/G Ratio 01/31/2020 1.2  g/dL Final   • BUN/Creatinine Ratio 01/31/2020 12.2  7.0 - 25.0 Final   • Anion Gap 01/31/2020 13.4  5.0 - 15.0 mmol/L Final   • Hemoglobin A1C 01/31/2020 5.73* 4.80 - 5.60 % Final   • Total Cholesterol 01/31/2020 160  0 - 200 mg/dL Final   • Triglycerides 01/31/2020 67  0 - 150 mg/dL Final   • HDL Cholesterol 01/31/2020 44  40 - 60 mg/dL Final   • LDL Cholesterol  01/31/2020 103* 0 - 100 mg/dL Final   • VLDL Cholesterol 01/31/2020 13.4  5 - 40 mg/dL Final   • LDL/HDL Ratio 01/31/2020 2.33   Final   • TSH 01/31/2020 0.798  0.270 - 4.200 uIU/mL Final   • Free T4 01/31/2020 1.14  0.93 - 1.70 ng/dL Final   • T3, Free 01/31/2020 3.25  2.00 - 4.40 pg/mL Final   • 25 Hydroxy, Vitamin D 01/31/2020 14.4* 30.0 - 100.0 ng/ml Final      XR Spine Lumbar Complete 4+VW  Narrative: Procedure:  Lumbar spine        Indication:   "Lower back pain   .    Technique:  Five views   .    Prior relevant exam:  None.    Small anterior osteophytes at L4-L5. Mild degenerative changes of  the apophyseal joints at L5-S1.    Lumbar spine is otherwise unremarkable.  Impression: CONCLUSION: Mild degenerative changes L4-L5, L5-S1. Otherwise  unremarkable lumbar spine.    Electronically signed by:  Chung Alvarez MD  1/31/2020 12:52 PM CST  Workstation[email or web address]    @[email or web address]@  Immunization History   Administered Date(s) Administered   • Tetanus 01/01/2012   • influenza Split 01/01/2015       The following portions of the patient's history were reviewed and updated as appropriate: allergies, current medications, past family history, past medical history, past social history, past surgical history and problem list.        Physical Exam  /68 (BP Location: Right arm, Patient Position: Sitting, Cuff Size: Adult)   Pulse 76   Temp 98.7 °F (37.1 °C) (Temporal)   Ht 167.6 cm (66\")   Wt 79.7 kg (175 lb 9.6 oz)   SpO2 98%   BMI 28.34 kg/m²     Physical Exam   Constitutional: He is oriented to person, place, and time. He appears well-developed and well-nourished.   HENT:   Head: Normocephalic and atraumatic.   Right Ear: External ear normal.   Eyes: Pupils are equal, round, and reactive to light. Conjunctivae and EOM are normal.   Neck: Normal range of motion. Neck supple.   Cardiovascular: Normal rate, regular rhythm and normal heart sounds.   No murmur heard.  Pulmonary/Chest: Effort normal and breath sounds normal. No respiratory distress.   Abdominal: Soft. Bowel sounds are normal. He exhibits no distension. There is no tenderness.   Musculoskeletal: Normal range of motion. He exhibits tenderness. He exhibits no edema or deformity.   Neurological: He is alert and oriented to person, place, and time. No cranial nerve deficit.   Skin: Skin is warm. No rash noted. He is not diaphoretic. No erythema. No pallor.   Psychiatric: He has a normal mood and affect. " His behavior is normal.   Nursing note and vitals reviewed.      Assessment/Plan    Diagnosis Plan   1. Essential hypertension  CBC Auto Differential    Comprehensive Metabolic Panel    Hemoglobin A1c    Lipid Panel    Vitamin D 25 Hydroxy   2. Prediabetes  CBC Auto Differential    Comprehensive Metabolic Panel    Hemoglobin A1c    Lipid Panel    Vitamin D 25 Hydroxy   3. Mixed hyperlipidemia  CBC Auto Differential    Comprehensive Metabolic Panel    Hemoglobin A1c    Lipid Panel    Vitamin D 25 Hydroxy   4. Vitamin D deficiency  CBC Auto Differential    Comprehensive Metabolic Panel    Hemoglobin A1c    Lipid Panel    Vitamin D 25 Hydroxy        -recommend labwork  -annual physical exam today  -overweight - counseled weight loss >5 minutes BMI at 28.3   -vitamin D deficiency - off vitamin D once a week  -prediabetes prediabetes meal plan    -back pain - x-ray of lumbar spine - recommend PT/OT   -HTN -lisionpril 40 mgdaily.  Stop  HCTZ 12.5 mg daily.  Start Chlorthalidone 25 mg daily.   -HLP -  Off lipitor 40 mg PO qhs   -advised pt to go to ER or call 911 if symptoms worrisome or severe  -advised pt to followup with specialist and referrals   -advised pt to be safe and call with questions and concerns   -advised pt to be safe during COVID-19 pandemic   -total time with pt >25 minutes   -recheck in 3-6 months         This document has been electronically signed by Manuel Andersen MD on June 30, 2020 15:22

## 2020-06-30 ENCOUNTER — OFFICE VISIT (OUTPATIENT)
Dept: FAMILY MEDICINE CLINIC | Facility: CLINIC | Age: 60
End: 2020-06-30

## 2020-06-30 VITALS
SYSTOLIC BLOOD PRESSURE: 106 MMHG | OXYGEN SATURATION: 98 % | TEMPERATURE: 98.7 F | HEIGHT: 66 IN | WEIGHT: 175.6 LBS | DIASTOLIC BLOOD PRESSURE: 68 MMHG | BODY MASS INDEX: 28.22 KG/M2 | HEART RATE: 76 BPM

## 2020-06-30 DIAGNOSIS — R73.03 PREDIABETES: ICD-10-CM

## 2020-06-30 DIAGNOSIS — E55.9 VITAMIN D DEFICIENCY: ICD-10-CM

## 2020-06-30 DIAGNOSIS — I10 ESSENTIAL HYPERTENSION: Primary | ICD-10-CM

## 2020-06-30 DIAGNOSIS — E66.3 OVERWEIGHT: ICD-10-CM

## 2020-06-30 DIAGNOSIS — E78.2 MIXED HYPERLIPIDEMIA: ICD-10-CM

## 2020-06-30 PROCEDURE — 99213 OFFICE O/P EST LOW 20 MIN: CPT | Performed by: FAMILY MEDICINE

## 2020-06-30 NOTE — PATIENT INSTRUCTIONS
Prediabetes  Prediabetes is the condition of having a blood sugar (blood glucose) level that is higher than it should be, but not high enough for you to be diagnosed with type 2 diabetes. Having prediabetes puts you at risk for developing type 2 diabetes (type 2 diabetes mellitus). Prediabetes may be called impaired glucose tolerance or impaired fasting glucose.  Prediabetes usually does not cause symptoms. Your health care provider can diagnose this condition with blood tests. You may be tested for prediabetes if you are overweight and if you have at least one other risk factor for prediabetes.  What is blood glucose, and how is it measured?  Blood glucose refers to the amount of glucose in your bloodstream. Glucose comes from eating foods that contain sugars and starches (carbohydrates), which the body breaks down into glucose. Your blood glucose level may be measured in mg/dL (milligrams per deciliter) or mmol/L (millimoles per liter). Your blood glucose may be checked with one or more of the following blood tests:  · A fasting blood glucose (FBG) test. You will not be allowed to eat (you will fast) for 8 hours or longer before a blood sample is taken.  ? A normal range for FBG is  mg/dl (3.9-5.6 mmol/L).  · An A1c (hemoglobin A1c) blood test. This test provides information about blood glucose control over the previous 2?3 months.  · An oral glucose tolerance test (OGTT). This test measures your blood glucose at two times:  ? After fasting. This is your baseline level.  ? Two hours after you drink a beverage that contains glucose.  You may be diagnosed with prediabetes:  · If your FBG is 100?125 mg/dL (5.6-6.9 mmol/L).  · If your A1c level is 5.7?6.4%.  · If your OGTT result is 140?199 mg/dL (7.8-11 mmol/L).  These blood tests may be repeated to confirm your diagnosis.  How can this condition affect me?  The pancreas produces a hormone (insulin) that helps to move glucose from the bloodstream into cells.  When cells in the body do not respond properly to insulin that the body makes (insulin resistance), excess glucose builds up in the blood instead of going into cells. As a result, high blood glucose (hyperglycemia) can develop, which can cause many complications. Hyperglycemia is a symptom of prediabetes.  Having high blood glucose for a long time is dangerous. Too much glucose in your blood can damage your nerves and blood vessels. Long-term damage can lead to complications from diabetes, which may include:  · Heart disease.  · Stroke.  · Blindness.  · Kidney disease.  · Depression.  · Poor circulation in the feet and legs, which could lead to surgical removal (amputation) in severe cases.  What can increase my risk?  Risk factors for prediabetes include:  · Having a family member with type 2 diabetes.  · Being overweight or obese.  · Being older than age 45.  · Being of , -American, /, or / descent.  · Having an inactive (sedentary) lifestyle.  · Having a history of heart disease.  · History of gestational diabetes or polycystic ovary syndrome (PCOS), in women.  · Having low levels of good cholesterol (HDL-C) or high levels of blood fats (triglycerides).  · Having high blood pressure.  What actions can I take to prevent diabetes?         · Be physically active.  ? Do moderate-intensity physical activity for 30 or more minutes on 5 or more days of the week, or as much as told by your health care provider. This could be brisk walking, biking, or water aerobics.  ? Ask your health care provider what activities are safe for you. A mix of physical activities may be best, such as walking, swimming, cycling, and strength training.  · Lose weight as told by your health care provider.  ? Losing 5-7% of your body weight can reverse insulin resistance.  ? Your health care provider can determine how much weight loss is best for you and can help you lose weight  safely.  · Follow a healthy meal plan. This includes eating lean proteins, complex carbohydrates, fresh fruits and vegetables, low-fat dairy products, and healthy fats.  ? Follow instructions from your health care provider about eating or drinking restrictions.  ? Make an appointment to see a diet and nutrition specialist (registered dietitian) to help you create a healthy eating plan that is right for you.  · Do not smoke or use any tobacco products, such as cigarettes, chewing tobacco, and e-cigarettes. If you need help quitting, ask your health care provider.  · Take over-the-counter and prescription medicines as told by your health care provider. You may be prescribed medicines that help lower the risk of type 2 diabetes.  · Keep all follow-up visits as told by your health care provider. This is important.  Summary  · Prediabetes is the condition of having a blood sugar (blood glucose) level that is higher than it should be, but not high enough for you to be diagnosed with type 2 diabetes.  · Having prediabetes puts you at risk for developing type 2 diabetes (type 2 diabetes mellitus).  · To help prevent type 2 diabetes, make lifestyle changes such as being physically active and eating a healthy diet. Lose weight as told by your health care provider.  This information is not intended to replace advice given to you by your health care provider. Make sure you discuss any questions you have with your health care provider.  Document Released: 04/10/2017 Document Revised: 04/10/2020 Document Reviewed: 02/07/2017  [employer] Patient Education © 2020 [employer]    Prediabetes  Prediabetes is the condition of having a blood sugar (blood glucose) level that is higher than it should be, but not high enough for you to be diagnosed with type 2 diabetes. Having prediabetes puts you at risk for developing type 2 diabetes (type 2 diabetes mellitus). Prediabetes may be called impaired glucose tolerance or impaired fasting  glucose.  Prediabetes usually does not cause symptoms. Your health care provider can diagnose this condition with blood tests. You may be tested for prediabetes if you are overweight and if you have at least one other risk factor for prediabetes.  What is blood glucose, and how is it measured?  Blood glucose refers to the amount of glucose in your bloodstream. Glucose comes from eating foods that contain sugars and starches (carbohydrates), which the body breaks down into glucose. Your blood glucose level may be measured in mg/dL (milligrams per deciliter) or mmol/L (millimoles per liter). Your blood glucose may be checked with one or more of the following blood tests:  · A fasting blood glucose (FBG) test. You will not be allowed to eat (you will fast) for 8 hours or longer before a blood sample is taken.  ? A normal range for FBG is  mg/dl (3.9-5.6 mmol/L).  · An A1c (hemoglobin A1c) blood test. This test provides information about blood glucose control over the previous 2?3 months.  · An oral glucose tolerance test (OGTT). This test measures your blood glucose at two times:  ? After fasting. This is your baseline level.  ? Two hours after you drink a beverage that contains glucose.  You may be diagnosed with prediabetes:  · If your FBG is 100?125 mg/dL (5.6-6.9 mmol/L).  · If your A1c level is 5.7?6.4%.  · If your OGTT result is 140?199 mg/dL (7.8-11 mmol/L).  These blood tests may be repeated to confirm your diagnosis.  How can this condition affect me?  The pancreas produces a hormone (insulin) that helps to move glucose from the bloodstream into cells. When cells in the body do not respond properly to insulin that the body makes (insulin resistance), excess glucose builds up in the blood instead of going into cells. As a result, high blood glucose (hyperglycemia) can develop, which can cause many complications. Hyperglycemia is a symptom of prediabetes.  Having high blood glucose for a long time is  dangerous. Too much glucose in your blood can damage your nerves and blood vessels. Long-term damage can lead to complications from diabetes, which may include:  · Heart disease.  · Stroke.  · Blindness.  · Kidney disease.  · Depression.  · Poor circulation in the feet and legs, which could lead to surgical removal (amputation) in severe cases.  What can increase my risk?  Risk factors for prediabetes include:  · Having a family member with type 2 diabetes.  · Being overweight or obese.  · Being older than age 45.  · Being of , -American, /, or / descent.  · Having an inactive (sedentary) lifestyle.  · Having a history of heart disease.  · History of gestational diabetes or polycystic ovary syndrome (PCOS), in women.  · Having low levels of good cholesterol (HDL-C) or high levels of blood fats (triglycerides).  · Having high blood pressure.  What actions can I take to prevent diabetes?         · Be physically active.  ? Do moderate-intensity physical activity for 30 or more minutes on 5 or more days of the week, or as much as told by your health care provider. This could be brisk walking, biking, or water aerobics.  ? Ask your health care provider what activities are safe for you. A mix of physical activities may be best, such as walking, swimming, cycling, and strength training.  · Lose weight as told by your health care provider.  ? Losing 5-7% of your body weight can reverse insulin resistance.  ? Your health care provider can determine how much weight loss is best for you and can help you lose weight safely.  · Follow a healthy meal plan. This includes eating lean proteins, complex carbohydrates, fresh fruits and vegetables, low-fat dairy products, and healthy fats.  ? Follow instructions from your health care provider about eating or drinking restrictions.  ? Make an appointment to see a diet and nutrition specialist (registered dietitian) to help you  create a healthy eating plan that is right for you.  · Do not smoke or use any tobacco products, such as cigarettes, chewing tobacco, and e-cigarettes. If you need help quitting, ask your health care provider.  · Take over-the-counter and prescription medicines as told by your health care provider. You may be prescribed medicines that help lower the risk of type 2 diabetes.  · Keep all follow-up visits as told by your health care provider. This is important.  Summary  · Prediabetes is the condition of having a blood sugar (blood glucose) level that is higher than it should be, but not high enough for you to be diagnosed with type 2 diabetes.  · Having prediabetes puts you at risk for developing type 2 diabetes (type 2 diabetes mellitus).  · To help prevent type 2 diabetes, make lifestyle changes such as being physically active and eating a healthy diet. Lose weight as told by your health care provider.  This information is not intended to replace advice given to you by your health care provider. Make sure you discuss any questions you have with your health care provider.  Document Released: 04/10/2017 Document Revised: 04/10/2020 Document Reviewed: 02/07/2017  [employer] Patient Education © 2020 [employer]    Preventing Type 2 Diabetes Mellitus  Type 2 diabetes (type 2 diabetes mellitus) is a long-term (chronic) disease that affects blood sugar (glucose) levels. Normally, a hormone called insulin allows glucose to enter cells in the body. The cells use glucose for energy. In type 2 diabetes, one or both of these problems may be present:  · The body does not make enough insulin.  · The body does not respond properly to insulin that it makes (insulin resistance).  Insulin resistance or lack of insulin causes excess glucose to build up in the blood instead of going into cells. As a result, high blood glucose (hyperglycemia) develops, which can cause many complications. Being overweight or obese and having an inactive  (sedentary) lifestyle can increase your risk for diabetes. Type 2 diabetes can be delayed or prevented by making certain nutrition and lifestyle changes.  What nutrition changes can be made?    · Eat healthy meals and snacks regularly. Keep a healthy snack with you for when you get hungry between meals, such as fruit or a handful of nuts.  · Eat lean meats and proteins that are low in saturated fats, such as chicken, fish, egg whites, and beans. Avoid processed meats.  · Eat plenty of fruits and vegetables and plenty of grains that have not been processed (whole grains). It is recommended that you eat:  ? 1½?2 cups of fruit every day.  ? 2½?3 cups of vegetables every day.  ? 6?8 oz of whole grains every day, such as oats, whole wheat, bulgur, brown rice, quinoa, and millet.  · Eat low-fat dairy products, such as milk, yogurt, and cheese.  · Eat foods that contain healthy fats, such as nuts, avocado, olive oil, and canola oil.  · Drink water throughout the day. Avoid drinks that contain added sugar, such as soda or sweet tea.  · Follow instructions from your health care provider about specific eating or drinking restrictions.  · Control how much food you eat at a time (portion size).  ? Check food labels to find out the serving sizes of foods.  ? Use a kitchen scale to weigh amounts of foods.  · Saute or steam food instead of frying it. Cook with water or broth instead of oils or butter.  · Limit your intake of:  ? Salt (sodium). Have no more than 1 tsp (2,400 mg) of sodium a day. If you have heart disease or high blood pressure, have less than ½?¾ tsp (1,500 mg) of sodium a day.  ? Saturated fat. This is fat that is solid at room temperature, such as butter or fat on meat.  What lifestyle changes can be made?  Activity    · Do moderate-intensity physical activity for at least 30 minutes on at least 5 days of the week, or as much as told by your health care provider.  · Ask your health care provider what activities  are safe for you. A mix of physical activities may be best, such as walking, swimming, cycling, and strength training.  · Try to add physical activity into your day. For example:  ? Park in spots that are farther away than usual, so that you walk more. For example, park in a far corner of the parking lot when you go to the office or the grocery store.  ? Take a walk during your lunch break.  ? Use stairs instead of elevators or escalators.  Weight Loss  · Lose weight as directed. Your health care provider can determine how much weight loss is best for you and can help you lose weight safely.  · If you are overweight or obese, you may be instructed to lose at least 5?7 % of your body weight.  Alcohol and Tobacco    · Limit alcohol intake to no more than 1 drink a day for nonpregnant women and 2 drinks a day for men. One drink equals 12 oz of beer, 5 oz of wine, or 1½ oz of hard liquor.  · Do not use any tobacco products, such as cigarettes, chewing tobacco, and e-cigarettes. If you need help quitting, ask your health care provider.  Work With Your Health Care Provider  · Have your blood glucose tested regularly, as told by your health care provider.  · Discuss your risk factors and how you can reduce your risk for diabetes.  · Get screening tests as told by your health care provider. You may have screening tests regularly, especially if you have certain risk factors for type 2 diabetes.  · Make an appointment with a diet and nutrition specialist (registered dietitian). A registered dietitian can help you make a healthy eating plan and can help you understand portion sizes and food labels.  Why are these changes important?  · It is possible to prevent or delay type 2 diabetes and related health problems by making lifestyle and nutrition changes.  · It can be difficult to recognize signs of type 2 diabetes. The best way to avoid possible damage to your body is to take actions to prevent the disease before you develop  symptoms.  What can happen if changes are not made?  · Your blood glucose levels may keep increasing. Having high blood glucose for a long time is dangerous. Too much glucose in your blood can damage your blood vessels, heart, kidneys, nerves, and eyes.  · You may develop prediabetes or type 2 diabetes. Type 2 diabetes can lead to many chronic health problems and complications, such as:  ? Heart disease.  ? Stroke.  ? Blindness.  ? Kidney disease.  ? Depression.  ? Poor circulation in the feet and legs, which could lead to surgical removal (amputation) in severe cases.  Where to find support  · Ask your health care provider to recommend a registered dietitian, diabetes educator, or weight loss program.  · Look for local or online weight loss groups.  · Join a gym, fitness club, or outdoor activity group, such as a walking club.  Where to find more information  To learn more about diabetes and diabetes prevention, visit:  · American Diabetes Association (ADA): www.diabetes.org  · National [place] of Diabetes and Digestive and Kidney Diseases: www.niddk.nih.gov/health-information/diabetes  To learn more about healthy eating, visit:  · The U.S. Department of Agriculture (USDA), Choose My Plate: www.[place]ain[place]plate.gov/food-groups  · Office of Disease Prevention and Health Promotion (ODPHP), Dietary Guidelines: www.health.gov/dietaryguidelines  Summary  · You can reduce your risk for type 2 diabetes by increasing your physical activity, eating healthy foods, and losing weight as directed.  · Talk with your health care provider about your risk for type 2 diabetes. Ask about any blood tests or screening tests that you need to have.  This information is not intended to replace advice given to you by your health care provider. Make sure you discuss any questions you have with your health care provider.  Document Released: 04/10/2017 Document Revised: 04/10/2020 Document Reviewed: 02/07/2017  [employer] Patient Education ©  2020 [employer]    Preventing Type 2 Diabetes Mellitus  Type 2 diabetes (type 2 diabetes mellitus) is a long-term (chronic) disease that affects blood sugar (glucose) levels. Normally, a hormone called insulin allows glucose to enter cells in the body. The cells use glucose for energy. In type 2 diabetes, one or both of these problems may be present:  · The body does not make enough insulin.  · The body does not respond properly to insulin that it makes (insulin resistance).  Insulin resistance or lack of insulin causes excess glucose to build up in the blood instead of going into cells. As a result, high blood glucose (hyperglycemia) develops, which can cause many complications. Being overweight or obese and having an inactive (sedentary) lifestyle can increase your risk for diabetes. Type 2 diabetes can be delayed or prevented by making certain nutrition and lifestyle changes.  What nutrition changes can be made?    · Eat healthy meals and snacks regularly. Keep a healthy snack with you for when you get hungry between meals, such as fruit or a handful of nuts.  · Eat lean meats and proteins that are low in saturated fats, such as chicken, fish, egg whites, and beans. Avoid processed meats.  · Eat plenty of fruits and vegetables and plenty of grains that have not been processed (whole grains). It is recommended that you eat:  ? 1½?2 cups of fruit every day.  ? 2½?3 cups of vegetables every day.  ? 6?8 oz of whole grains every day, such as oats, whole wheat, bulgur, brown rice, quinoa, and millet.  · Eat low-fat dairy products, such as milk, yogurt, and cheese.  · Eat foods that contain healthy fats, such as nuts, avocado, olive oil, and canola oil.  · Drink water throughout the day. Avoid drinks that contain added sugar, such as soda or sweet tea.  · Follow instructions from your health care provider about specific eating or drinking restrictions.  · Control how much food you eat at a time (portion  size).  ? Check food labels to find out the serving sizes of foods.  ? Use a kitchen scale to weigh amounts of foods.  · Saute or steam food instead of frying it. Cook with water or broth instead of oils or butter.  · Limit your intake of:  ? Salt (sodium). Have no more than 1 tsp (2,400 mg) of sodium a day. If you have heart disease or high blood pressure, have less than ½?¾ tsp (1,500 mg) of sodium a day.  ? Saturated fat. This is fat that is solid at room temperature, such as butter or fat on meat.  What lifestyle changes can be made?  Activity    · Do moderate-intensity physical activity for at least 30 minutes on at least 5 days of the week, or as much as told by your health care provider.  · Ask your health care provider what activities are safe for you. A mix of physical activities may be best, such as walking, swimming, cycling, and strength training.  · Try to add physical activity into your day. For example:  ? Park in spots that are farther away than usual, so that you walk more. For example, park in a far corner of the parking lot when you go to the office or the grocery store.  ? Take a walk during your lunch break.  ? Use stairs instead of elevators or escalators.  Weight Loss  · Lose weight as directed. Your health care provider can determine how much weight loss is best for you and can help you lose weight safely.  · If you are overweight or obese, you may be instructed to lose at least 5?7 % of your body weight.  Alcohol and Tobacco    · Limit alcohol intake to no more than 1 drink a day for nonpregnant women and 2 drinks a day for men. One drink equals 12 oz of beer, 5 oz of wine, or 1½ oz of hard liquor.  · Do not use any tobacco products, such as cigarettes, chewing tobacco, and e-cigarettes. If you need help quitting, ask your health care provider.  Work With Your Health Care Provider  · Have your blood glucose tested regularly, as told by your health care provider.  · Discuss your risk factors  and how you can reduce your risk for diabetes.  · Get screening tests as told by your health care provider. You may have screening tests regularly, especially if you have certain risk factors for type 2 diabetes.  · Make an appointment with a diet and nutrition specialist (registered dietitian). A registered dietitian can help you make a healthy eating plan and can help you understand portion sizes and food labels.  Why are these changes important?  · It is possible to prevent or delay type 2 diabetes and related health problems by making lifestyle and nutrition changes.  · It can be difficult to recognize signs of type 2 diabetes. The best way to avoid possible damage to your body is to take actions to prevent the disease before you develop symptoms.  What can happen if changes are not made?  · Your blood glucose levels may keep increasing. Having high blood glucose for a long time is dangerous. Too much glucose in your blood can damage your blood vessels, heart, kidneys, nerves, and eyes.  · You may develop prediabetes or type 2 diabetes. Type 2 diabetes can lead to many chronic health problems and complications, such as:  ? Heart disease.  ? Stroke.  ? Blindness.  ? Kidney disease.  ? Depression.  ? Poor circulation in the feet and legs, which could lead to surgical removal (amputation) in severe cases.  Where to find support  · Ask your health care provider to recommend a registered dietitian, diabetes educator, or weight loss program.  · Look for local or online weight loss groups.  · Join a gym, fitness club, or outdoor activity group, such as a walking club.  Where to find more information  To learn more about diabetes and diabetes prevention, visit:  · American Diabetes Association (ADA): www.diabetes.org  · National [place] of Diabetes and Digestive and Kidney Diseases: www.niddk.nih.gov/health-information/diabetes  To learn more about healthy eating, visit:  · The U.S. Department of Agriculture (USDA),  Choose My Plate: www.[place]plate.gov/food-groups  · Office of Disease Prevention and Health Promotion (ODPHP), Dietary Guidelines: www.health.gov/dietaryguidelines  Summary  · You can reduce your risk for type 2 diabetes by increasing your physical activity, eating healthy foods, and losing weight as directed.  · Talk with your health care provider about your risk for type 2 diabetes. Ask about any blood tests or screening tests that you need to have.  This information is not intended to replace advice given to you by your health care provider. Make sure you discuss any questions you have with your health care provider.  Document Released: 04/10/2017 Document Revised: 04/10/2020 Document Reviewed: 02/07/2017  [employer] Patient Education © 2020 [employer]

## 2021-01-19 ENCOUNTER — TELEPHONE (OUTPATIENT)
Dept: FAMILY MEDICINE CLINIC | Facility: CLINIC | Age: 61
End: 2021-01-19

## 2021-01-19 RX ORDER — SILDENAFIL 50 MG/1
50 TABLET, FILM COATED ORAL DAILY PRN
Qty: 30 TABLET | Refills: 3 | Status: SHIPPED | OUTPATIENT
Start: 2021-01-19 | End: 2021-04-15 | Stop reason: CLARIF

## 2021-01-21 ENCOUNTER — TELEPHONE (OUTPATIENT)
Dept: FAMILY MEDICINE CLINIC | Facility: CLINIC | Age: 61
End: 2021-01-21

## 2021-04-09 NOTE — PROGRESS NOTES
Chief Complaint  Hyperlipidemia, Vitamin D Deficiency, Back Pain, and Hypertension    Subjective          Amos Pato Miranda presents to [facility]  History of Present Illness     Pt is 62 yo male with management of HTN, HLP,  Right shoulder pain, low back pain with sciatica, ED,Vitamin D deficiency          6/29/20 in office visit for recheck on pt's HTN , HLp,  Right shoudler pain.. Pt had labwork done on 1/31/20 that shwoed low vitmain D thyroid studies were normal lipid panel showed LDL at 103. hga1c at 5.73. CMP showed GFR at 72. And normal liver enzymes. CBC showed normal hemoglobin. Pt is due for new labwork. His BP is stable with chlorthalidone 25 mg dialy and lisinopril 40 mg dialy. He stopped tkiang vitamin D and lipitor 40 mg PO qhs. No major concerns.  He is doing overall. No chest pain, no dizzinessn no shortness of air.  His back pain is beter with exercises at home and PT/OT     4/15/21 in office visit for recheck on pt's above medical issues. Pt is due for new labwork. He continues to take his medications for HTN, ED.  BP stable today. Pt denies any chest pain or dizziness. He continues to work at [employer]. He is doing well overall. No chest pain no diziness         Hypertension   This is a chronic problem. The current episode started more than 1 year ago. The problem has beenimproving  since onset. The problem is uncontrolled. Pertinent negatives include no anxiety, blurred vision, chest pain, headaches, malaise/fatigue, neck pain, orthopnea, palpitations, peripheral edema, PND, shortness of breath or sweats. Risk factors for coronary artery disease include dyslipidemia and male gender. Past treatments include ACE inhibitors and diuretics. Current antihypertension treatment includes diuretics and ACE inhibitors. The current treatment provides no improvement. There are no compliance problems.  There is no history of angina, kidney disease, CAD/MI, CVA, heart  "failure, left ventricular hypertrophy, PVD or retinopathy. There is no history of chronic renal disease, coarctation of the aorta, hyperaldosteronism, hypercortisolism, a hypertension causing med, pheochromocytoma, renovascular disease or sleep apnea.     Objective   Vital Signs:   /76 (BP Location: Left arm, Patient Position: Sitting, Cuff Size: Large Adult)   Pulse 90   Temp 97.1 °F (36.2 °C)   Ht 167.6 cm (66\")   Wt 81.6 kg (180 lb)   SpO2 97%   BMI 29.05 kg/m²       Physical Exam  Vitals and nursing note reviewed.   Constitutional:       Appearance: He is well-developed. He is not diaphoretic.   HENT:      Head: Normocephalic and atraumatic.      Right Ear: External ear normal.   Eyes:      Conjunctiva/sclera: Conjunctivae normal.      Pupils: Pupils are equal, round, and reactive to light.   Cardiovascular:      Rate and Rhythm: Normal rate and regular rhythm.      Heart sounds: Normal heart sounds. No murmur heard.     Pulmonary:      Effort: Pulmonary effort is normal. No respiratory distress.      Breath sounds: Normal breath sounds.   Abdominal:      General: Bowel sounds are normal. There is no distension.      Palpations: Abdomen is soft.      Tenderness: There is no abdominal tenderness.   Musculoskeletal:         General: Tenderness present. No deformity. Normal range of motion.      Cervical back: Normal range of motion and neck supple.   Skin:     General: Skin is warm.      Coloration: Skin is not pale.      Findings: No erythema or rash.   Neurological:      Mental Status: He is alert and oriented to person, place, and time.      Cranial Nerves: No cranial nerve deficit.   Psychiatric:         Behavior: Behavior normal.        Result Review :                 Assessment and Plan    Diagnoses and all orders for this visit:    1. Essential hypertension (Primary)  -     CBC Auto Differential; Future  -     Comprehensive Metabolic Panel; Future  -     Hemoglobin A1c; Future  -     Lipid " Panel; Future  -     Vitamin D 25 Hydroxy; Future    2. Vitamin D deficiency  -     CBC Auto Differential; Future  -     Comprehensive Metabolic Panel; Future  -     Hemoglobin A1c; Future  -     Lipid Panel; Future  -     Vitamin D 25 Hydroxy; Future    3. Overweight  -     CBC Auto Differential; Future  -     Comprehensive Metabolic Panel; Future  -     Hemoglobin A1c; Future  -     Lipid Panel; Future  -     Vitamin D 25 Hydroxy; Future    4. Mixed hyperlipidemia  -     CBC Auto Differential; Future  -     Comprehensive Metabolic Panel; Future  -     Hemoglobin A1c; Future  -     Lipid Panel; Future  -     Vitamin D 25 Hydroxy; Future    5. Prediabetes  -     CBC Auto Differential; Future  -     Comprehensive Metabolic Panel; Future  -     Hemoglobin A1c; Future  -     Lipid Panel; Future  -     Vitamin D 25 Hydroxy; Future    Other orders  -     chlorthalidone (HYGROTON) 25 MG tablet; Take 1 tablet by mouth Daily.  Dispense: 30 tablet; Refill: 3  -     lisinopril (PRINIVIL,ZESTRIL) 40 MG tablet; Take 1 tablet by mouth Daily.  Dispense: 30 tablet; Refill: 3  -     Turmeric (QC Tumeric Complex) 500 MG capsule; Take 500 mg by mouth Daily.  Dispense: 30 capsule; Refill: 3            -recommend labwork  -annual physical exam today  -will get last colonscopy screening from Dr. Quinn   -overweight - counseled weight loss >5 minutes BMI at 29.05    -vitamin D deficiency - check vitamin D levels   -prediabetes prediabetes meal plan    -HTN -stable lisionpril 40 mgdaily.  on Chlorthalidone 25 mg daily.   -HLP -  Off lipitor 40 mg PO qhs   -advised pt to go to ER or call 911 if symptoms worrisome or severe  -advised pt to followup with specialist and referrals   -advised pt to be safe and call with questions and concerns   -advised pt to be safe during COVID-19 pandemic   I spent 25  minutes caring for Amos on this date of service. This time includes time spent by me in the following activities: preparing for the visit,  reviewing tests, obtaining and/or reviewing a separately obtained history, performing a medically appropriate examination and/or evaluation, counseling and educating the patient/family/caregiver, ordering medications, tests, or procedures, referring and communicating with other health care professionals, documenting information in the medical record and care coordination.   -recheck in 3 months         This document has been electronically signed by Manuel Andersen MD on April 15, 2021 15:56 CDT        Follow Up   Return in about 3 months (around 7/15/2021).  Patient was given instructions and counseling regarding his condition or for health maintenance advice. Please see specific information pulled into the AVS if appropriate.

## 2021-04-15 ENCOUNTER — OFFICE VISIT (OUTPATIENT)
Dept: FAMILY MEDICINE CLINIC | Facility: CLINIC | Age: 61
End: 2021-04-15

## 2021-04-15 VITALS
TEMPERATURE: 97.1 F | WEIGHT: 180 LBS | BODY MASS INDEX: 28.93 KG/M2 | DIASTOLIC BLOOD PRESSURE: 76 MMHG | OXYGEN SATURATION: 97 % | SYSTOLIC BLOOD PRESSURE: 128 MMHG | HEIGHT: 66 IN | HEART RATE: 90 BPM

## 2021-04-15 DIAGNOSIS — E66.3 OVERWEIGHT: ICD-10-CM

## 2021-04-15 DIAGNOSIS — E78.2 MIXED HYPERLIPIDEMIA: ICD-10-CM

## 2021-04-15 DIAGNOSIS — R73.03 PREDIABETES: ICD-10-CM

## 2021-04-15 DIAGNOSIS — E55.9 VITAMIN D DEFICIENCY: ICD-10-CM

## 2021-04-15 DIAGNOSIS — I10 ESSENTIAL HYPERTENSION: Primary | ICD-10-CM

## 2021-04-15 PROCEDURE — 99214 OFFICE O/P EST MOD 30 MIN: CPT | Performed by: FAMILY MEDICINE

## 2021-04-15 RX ORDER — VIT C/B6/B5/MAGNESIUM/HERB 173 50-5-6-5MG
500 CAPSULE ORAL DAILY
Qty: 30 CAPSULE | Refills: 3 | Status: SHIPPED | OUTPATIENT
Start: 2021-04-15 | End: 2021-07-21 | Stop reason: SDUPTHER

## 2021-04-15 RX ORDER — TADALAFIL 10 MG/1
TABLET ORAL
COMMUNITY
Start: 2021-03-19 | End: 2021-04-22 | Stop reason: SDUPTHER

## 2021-04-15 RX ORDER — LISINOPRIL 40 MG/1
40 TABLET ORAL DAILY
Qty: 30 TABLET | Refills: 3 | Status: SHIPPED | OUTPATIENT
Start: 2021-04-15 | End: 2021-11-29 | Stop reason: SDUPTHER

## 2021-04-15 RX ORDER — CHLORTHALIDONE 25 MG/1
25 TABLET ORAL DAILY
Qty: 30 TABLET | Refills: 3 | Status: SHIPPED | OUTPATIENT
Start: 2021-04-15 | End: 2021-07-21

## 2021-04-22 ENCOUNTER — LAB (OUTPATIENT)
Dept: LAB | Facility: HOSPITAL | Age: 61
End: 2021-04-22

## 2021-04-22 DIAGNOSIS — E55.9 VITAMIN D DEFICIENCY: ICD-10-CM

## 2021-04-22 DIAGNOSIS — R73.03 PREDIABETES: ICD-10-CM

## 2021-04-22 DIAGNOSIS — E78.2 MIXED HYPERLIPIDEMIA: ICD-10-CM

## 2021-04-22 DIAGNOSIS — I10 ESSENTIAL HYPERTENSION: ICD-10-CM

## 2021-04-22 DIAGNOSIS — E66.3 OVERWEIGHT: ICD-10-CM

## 2021-04-22 LAB
25(OH)D3 SERPL-MCNC: 33.8 NG/ML
ALBUMIN SERPL-MCNC: 3.9 G/DL (ref 3.5–5.2)
ALBUMIN/GLOB SERPL: 1.1 G/DL
ALP SERPL-CCNC: 49 U/L (ref 39–117)
ALT SERPL W P-5'-P-CCNC: 30 U/L (ref 1–41)
ANION GAP SERPL CALCULATED.3IONS-SCNC: 8.4 MMOL/L (ref 5–15)
AST SERPL-CCNC: 24 U/L (ref 1–40)
BASOPHILS # BLD AUTO: 0.04 10*3/MM3 (ref 0–0.2)
BASOPHILS NFR BLD AUTO: 0.5 % (ref 0–1.5)
BILIRUB SERPL-MCNC: 0.5 MG/DL (ref 0–1.2)
BUN SERPL-MCNC: 16 MG/DL (ref 8–23)
BUN/CREAT SERPL: 12.4 (ref 7–25)
CALCIUM SPEC-SCNC: 9.3 MG/DL (ref 8.6–10.5)
CHLORIDE SERPL-SCNC: 104 MMOL/L (ref 98–107)
CHOLEST SERPL-MCNC: 133 MG/DL (ref 0–200)
CO2 SERPL-SCNC: 27.6 MMOL/L (ref 22–29)
CREAT SERPL-MCNC: 1.29 MG/DL (ref 0.76–1.27)
DEPRECATED RDW RBC AUTO: 42 FL (ref 37–54)
EOSINOPHIL # BLD AUTO: 0.08 10*3/MM3 (ref 0–0.4)
EOSINOPHIL NFR BLD AUTO: 1 % (ref 0.3–6.2)
ERYTHROCYTE [DISTWIDTH] IN BLOOD BY AUTOMATED COUNT: 13 % (ref 12.3–15.4)
GFR SERPL CREATININE-BSD FRML MDRD: 68 ML/MIN/1.73
GLOBULIN UR ELPH-MCNC: 3.7 GM/DL
GLUCOSE SERPL-MCNC: 97 MG/DL (ref 65–99)
HBA1C MFR BLD: 5.67 % (ref 4.8–5.6)
HCT VFR BLD AUTO: 43.7 % (ref 37.5–51)
HDLC SERPL-MCNC: 41 MG/DL (ref 40–60)
HGB BLD-MCNC: 14.9 G/DL (ref 13–17.7)
IMM GRANULOCYTES # BLD AUTO: 0.02 10*3/MM3 (ref 0–0.05)
IMM GRANULOCYTES NFR BLD AUTO: 0.2 % (ref 0–0.5)
LDLC SERPL CALC-MCNC: 81 MG/DL (ref 0–100)
LDLC/HDLC SERPL: 2 {RATIO}
LYMPHOCYTES # BLD AUTO: 1.83 10*3/MM3 (ref 0.7–3.1)
LYMPHOCYTES NFR BLD AUTO: 22.5 % (ref 19.6–45.3)
MCH RBC QN AUTO: 30.4 PG (ref 26.6–33)
MCHC RBC AUTO-ENTMCNC: 34.1 G/DL (ref 31.5–35.7)
MCV RBC AUTO: 89.2 FL (ref 79–97)
MONOCYTES # BLD AUTO: 0.55 10*3/MM3 (ref 0.1–0.9)
MONOCYTES NFR BLD AUTO: 6.8 % (ref 5–12)
NEUTROPHILS NFR BLD AUTO: 5.62 10*3/MM3 (ref 1.7–7)
NEUTROPHILS NFR BLD AUTO: 69 % (ref 42.7–76)
NRBC BLD AUTO-RTO: 0 /100 WBC (ref 0–0.2)
PLATELET # BLD AUTO: 214 10*3/MM3 (ref 140–450)
PMV BLD AUTO: 12 FL (ref 6–12)
POTASSIUM SERPL-SCNC: 4.9 MMOL/L (ref 3.5–5.2)
PROT SERPL-MCNC: 7.6 G/DL (ref 6–8.5)
RBC # BLD AUTO: 4.9 10*6/MM3 (ref 4.14–5.8)
SODIUM SERPL-SCNC: 140 MMOL/L (ref 136–145)
TRIGL SERPL-MCNC: 50 MG/DL (ref 0–150)
VLDLC SERPL-MCNC: 11 MG/DL (ref 5–40)
WBC # BLD AUTO: 8.14 10*3/MM3 (ref 3.4–10.8)

## 2021-04-22 PROCEDURE — 85025 COMPLETE CBC W/AUTO DIFF WBC: CPT

## 2021-04-22 PROCEDURE — 80061 LIPID PANEL: CPT

## 2021-04-22 PROCEDURE — 82306 VITAMIN D 25 HYDROXY: CPT

## 2021-04-22 PROCEDURE — 80053 COMPREHEN METABOLIC PANEL: CPT

## 2021-04-22 PROCEDURE — 83036 HEMOGLOBIN GLYCOSYLATED A1C: CPT

## 2021-04-22 RX ORDER — TADALAFIL 10 MG/1
TABLET ORAL
Qty: 10 TABLET | Refills: 3 | Status: SHIPPED | OUTPATIENT
Start: 2021-04-22 | End: 2022-08-25

## 2021-04-23 ENCOUNTER — TELEPHONE (OUTPATIENT)
Dept: FAMILY MEDICINE CLINIC | Facility: CLINIC | Age: 61
End: 2021-04-23

## 2021-04-23 NOTE — TELEPHONE ENCOUNTER
----- Message from Manuel Andersen MD sent at 4/22/2021  9:05 PM CDT -----  Please call pt     All labwork stable with hga1c down from 5.73 to 5.63. pt is prediabetic and continue to watch sugar and carb intake    Vitamin D low normal continue with vitamin D supplement     Kidney function shows GFR at 68 from 72. Pt has CKD stage 2  Continue to monitor     Recheck on next visit. Thanks

## 2021-07-21 ENCOUNTER — OFFICE VISIT (OUTPATIENT)
Dept: FAMILY MEDICINE CLINIC | Facility: CLINIC | Age: 61
End: 2021-07-21

## 2021-07-21 VITALS
HEIGHT: 66 IN | BODY MASS INDEX: 28.09 KG/M2 | OXYGEN SATURATION: 98 % | WEIGHT: 174.8 LBS | TEMPERATURE: 97.5 F | SYSTOLIC BLOOD PRESSURE: 104 MMHG | DIASTOLIC BLOOD PRESSURE: 62 MMHG | HEART RATE: 86 BPM

## 2021-07-21 DIAGNOSIS — E66.3 OVERWEIGHT: ICD-10-CM

## 2021-07-21 DIAGNOSIS — E78.2 MIXED HYPERLIPIDEMIA: ICD-10-CM

## 2021-07-21 DIAGNOSIS — E55.9 VITAMIN D DEFICIENCY: ICD-10-CM

## 2021-07-21 DIAGNOSIS — I10 ESSENTIAL HYPERTENSION: Primary | ICD-10-CM

## 2021-07-21 DIAGNOSIS — R73.03 PREDIABETES: ICD-10-CM

## 2021-07-21 PROCEDURE — 99214 OFFICE O/P EST MOD 30 MIN: CPT | Performed by: FAMILY MEDICINE

## 2021-07-21 RX ORDER — VIT C/B6/B5/MAGNESIUM/HERB 173 50-5-6-5MG
500 CAPSULE ORAL DAILY
Qty: 30 CAPSULE | Refills: 3 | Status: SHIPPED | OUTPATIENT
Start: 2021-07-21 | End: 2022-09-26

## 2021-07-21 NOTE — PATIENT INSTRUCTIONS
Stop chlorthalidone    Continue monitoring BP at home    Recheck in 5 months    Get labwork before next visit       Chronic Kidney Disease, Adult  Chronic kidney disease (CKD) occurs when the kidneys become damaged slowly over a long period of time. The kidneys are a pair of organs that do many important jobs in the body, including:  · Removing waste and extra fluid from the blood to make urine.  · Making hormones that maintain the amount of fluid in tissues and blood vessels.  · Maintaining the right amount of fluids and chemicals in the body.  A small amount of kidney damage may not cause problems, but a large amount of damage may make it hard or impossible for the kidneys to work the way they should. If steps are not taken to slow down kidney damage or to stop it from getting worse, the kidneys may stop working permanently (end-stage renal disease or ESRD). Most of the time, CKD does not go away, but it can often be controlled. People who have CKD are usually able to live normal lives.  What are the causes?  The most common causes of this condition are diabetes and high blood pressure (hypertension). Other causes include:  · Heart and blood vessel (cardiovascular) disease.  · Kidney diseases, such as:  ? Glomerulonephritis.  ? Interstitial nephritis.  ? Polycystic kidney disease.  ? Renal vascular disease.  · Diseases that affect the immune system.  · Genetic diseases.  · Medicines that damage the kidneys, such as anti-inflammatory medicines.  · Being around or being in contact with poisonous (toxic) substances.  · A kidney or urinary infection that occurs again and again (recurs).  · Vasculitis. This is swelling or inflammation of the blood vessels.  · A problem with urine flow that may be caused by:  ? Cancer.  ? Having kidney stones more than one time.  ? An enlarged prostate, in males.  What increases the risk?  You are more likely to develop this condition if you:  · Are older than age 60.  · Are  female.  · Are -American, , , , or .  · Are a current or former smoker.  · Are obese.  · Have a family history of kidney disease or failure.  · Often take medicines that are damaging to the kidneys.  What are the signs or symptoms?  Symptoms of this condition include:  · Swelling (edema) of the face, legs, ankles, or feet.  · Tiredness (lethargy) and having less energy.  · Nausea or vomiting.  · Confusion or trouble concentrating.  · Problems with urination, such as:  ? Painful or burning feeling during urination.  ? Decreased urine production.  ? Frequent urination, especially at night.  ? Bloody urine.  · Muscle twitches and cramps, especially in the legs.  · Shortness of breath.  · Weakness.  · Loss of appetite.  · Metallic taste in the mouth.  · Trouble sleeping.  · Dry, itchy skin.  · A low blood count (anemia).  · Pale lining of the eyelids and surface of the eye (conjunctiva).  Symptoms develop slowly and may not be obvious until the kidney damage becomes severe. It is possible to have kidney disease for years without having any symptoms.  How is this diagnosed?  This condition may be diagnosed based on:  · Blood tests.  · Urine tests.  · Imaging tests, such as an ultrasound or CT scan.  · A test in which a sample of tissue is removed from the kidneys to be examined under a microscope (kidney biopsy).  These test results will help your health care provider determine how serious the CKD is.  How is this treated?  There is no cure for most cases of this condition, but treatment usually relieves symptoms and prevents or slows the progression of the disease. Treatment may include:  · Making diet changes, which may require you to avoid alcohol, salty foods (sodium), and foods that are high in potassium, calcium, and protein.  · Medicines:  ? To lower blood pressure.  ? To control blood glucose.  ? To relieve anemia.  ? To relieve swelling.  ? To protect your  bones.  ? To improve the balance of electrolytes in your blood.  · Removing toxic waste from the body through types of dialysis, if the kidneys can no longer do their job (kidney failure).  · Managing any other conditions that are causing your CKD or making it worse.  Follow these instructions at home:  Medicines  · Take over-the-counter and prescription medicines only as told by your health care provider. The dose of some medicines that you take may need to be adjusted.  · Do not take any new medicines unless approved by your health care provider. Many medicines can worsen your kidney damage.  · Do not take any vitamin and mineral supplements unless approved by your health care provider. Many nutritional supplements can worsen your kidney damage.  General instructions  · Follow your prescribed diet as told by your health care provider.  · Do not use any products that contain nicotine or tobacco, such as cigarettes and e-cigarettes. If you need help quitting, ask your health care provider.  · Monitor and track your blood pressure at home. Report changes in your blood pressure as told by your health care provider.  · If you are being treated for diabetes, monitor and track your blood sugar (blood glucose) levels as told by your health care provider.  · Maintain a healthy weight. If you need help with this, ask your health care provider.  · Start or continue an exercise plan. Exercise at least 30 minutes a day, 5 days a week.  · Keep your immunizations up to date as told by your health care provider.  · Keep all follow-up visits as told by your health care provider. This is important.  Where to find more information  · American Association of Kidney Patients: www.aakp.org  · National Kidney Foundation: www.kidney.org  · American Kidney Fund: www.akfinc.org  · Life Options Rehabilitation Program: www.lifeoptions.org and www.kidneyschool.org  Contact a health care provider if:  · Your symptoms get worse.  · You develop  new symptoms.  Get help right away if:  · You develop symptoms of ESRD, which include:  ? Headaches.  ? Numbness in the hands or feet.  ? Easy bruising.  ? Frequent hiccups.  ? Chest pain.  ? Shortness of breath.  ? Lack of menstruation, in women.  · You have a fever.  · You have decreased urine production.  · You have pain or bleeding when you urinate.  Summary  · Chronic kidney disease (CKD) occurs when the kidneys become damaged slowly over a long period of time.  · The most common causes of this condition are diabetes and high blood pressure (hypertension).  · There is no cure for most cases of this condition, but treatment usually relieves symptoms and prevents or slows the progression of the disease. Treatment may include a combination of medicines and lifestyle changes.  This information is not intended to replace advice given to you by your health care provider. Make sure you discuss any questions you have with your health care provider.  Document Revised: 11/30/2018 Document Reviewed: 01/25/2018  [employer] Patient Education © 2020 [employer]    Chronic Kidney Disease, Adult  Chronic kidney disease (CKD) occurs when the kidneys become damaged slowly over a long period of time. The kidneys are a pair of organs that do many important jobs in the body, including:  · Removing waste and extra fluid from the blood to make urine.  · Making hormones that maintain the amount of fluid in tissues and blood vessels.  · Maintaining the right amount of fluids and chemicals in the body.  A small amount of kidney damage may not cause problems, but a large amount of damage may make it hard or impossible for the kidneys to work the way they should. If steps are not taken to slow down kidney damage or to stop it from getting worse, the kidneys may stop working permanently (end-stage renal disease or ESRD). Most of the time, CKD does not go away, but it can often be controlled. People who have CKD are usually able to live  normal lives.  What are the causes?  The most common causes of this condition are diabetes and high blood pressure (hypertension). Other causes include:  · Heart and blood vessel (cardiovascular) disease.  · Kidney diseases, such as:  ? Glomerulonephritis.  ? Interstitial nephritis.  ? Polycystic kidney disease.  ? Renal vascular disease.  · Diseases that affect the immune system.  · Genetic diseases.  · Medicines that damage the kidneys, such as anti-inflammatory medicines.  · Being around or being in contact with poisonous (toxic) substances.  · A kidney or urinary infection that occurs again and again (recurs).  · Vasculitis. This is swelling or inflammation of the blood vessels.  · A problem with urine flow that may be caused by:  ? Cancer.  ? Having kidney stones more than one time.  ? An enlarged prostate, in males.  What increases the risk?  You are more likely to develop this condition if you:  · Are older than age 60.  · Are female.  · Are -American, , , , or .  · Are a current or former smoker.  · Are obese.  · Have a family history of kidney disease or failure.  · Often take medicines that are damaging to the kidneys.  What are the signs or symptoms?  Symptoms of this condition include:  · Swelling (edema) of the face, legs, ankles, or feet.  · Tiredness (lethargy) and having less energy.  · Nausea or vomiting.  · Confusion or trouble concentrating.  · Problems with urination, such as:  ? Painful or burning feeling during urination.  ? Decreased urine production.  ? Frequent urination, especially at night.  ? Bloody urine.  · Muscle twitches and cramps, especially in the legs.  · Shortness of breath.  · Weakness.  · Loss of appetite.  · Metallic taste in the mouth.  · Trouble sleeping.  · Dry, itchy skin.  · A low blood count (anemia).  · Pale lining of the eyelids and surface of the eye (conjunctiva).  Symptoms develop slowly and may not be obvious until  the kidney damage becomes severe. It is possible to have kidney disease for years without having any symptoms.  How is this diagnosed?  This condition may be diagnosed based on:  · Blood tests.  · Urine tests.  · Imaging tests, such as an ultrasound or CT scan.  · A test in which a sample of tissue is removed from the kidneys to be examined under a microscope (kidney biopsy).  These test results will help your health care provider determine how serious the CKD is.  How is this treated?  There is no cure for most cases of this condition, but treatment usually relieves symptoms and prevents or slows the progression of the disease. Treatment may include:  · Making diet changes, which may require you to avoid alcohol, salty foods (sodium), and foods that are high in potassium, calcium, and protein.  · Medicines:  ? To lower blood pressure.  ? To control blood glucose.  ? To relieve anemia.  ? To relieve swelling.  ? To protect your bones.  ? To improve the balance of electrolytes in your blood.  · Removing toxic waste from the body through types of dialysis, if the kidneys can no longer do their job (kidney failure).  · Managing any other conditions that are causing your CKD or making it worse.  Follow these instructions at home:  Medicines  · Take over-the-counter and prescription medicines only as told by your health care provider. The dose of some medicines that you take may need to be adjusted.  · Do not take any new medicines unless approved by your health care provider. Many medicines can worsen your kidney damage.  · Do not take any vitamin and mineral supplements unless approved by your health care provider. Many nutritional supplements can worsen your kidney damage.  General instructions  · Follow your prescribed diet as told by your health care provider.  · Do not use any products that contain nicotine or tobacco, such as cigarettes and e-cigarettes. If you need help quitting, ask your health care  provider.  · Monitor and track your blood pressure at home. Report changes in your blood pressure as told by your health care provider.  · If you are being treated for diabetes, monitor and track your blood sugar (blood glucose) levels as told by your health care provider.  · Maintain a healthy weight. If you need help with this, ask your health care provider.  · Start or continue an exercise plan. Exercise at least 30 minutes a day, 5 days a week.  · Keep your immunizations up to date as told by your health care provider.  · Keep all follow-up visits as told by your health care provider. This is important.  Where to find more information  · American Association of Kidney Patients: www.aakp.org  · National Kidney Foundation: www.kidney.org  · American Kidney Fund: www.akfinc.org  · Life Options Rehabilitation Program: www.lifeoptions.org and www.kidneyschool.org  Contact a health care provider if:  · Your symptoms get worse.  · You develop new symptoms.  Get help right away if:  · You develop symptoms of ESRD, which include:  ? Headaches.  ? Numbness in the hands or feet.  ? Easy bruising.  ? Frequent hiccups.  ? Chest pain.  ? Shortness of breath.  ? Lack of menstruation, in women.  · You have a fever.  · You have decreased urine production.  · You have pain or bleeding when you urinate.  Summary  · Chronic kidney disease (CKD) occurs when the kidneys become damaged slowly over a long period of time.  · The most common causes of this condition are diabetes and high blood pressure (hypertension).  · There is no cure for most cases of this condition, but treatment usually relieves symptoms and prevents or slows the progression of the disease. Treatment may include a combination of medicines and lifestyle changes.  This information is not intended to replace advice given to you by your health care provider. Make sure you discuss any questions you have with your health care provider.  Document Revised: 11/30/2018  Document Reviewed: 01/25/2018  [employer] Patient Education © 2020 [employer]

## 2021-11-30 RX ORDER — LISINOPRIL 40 MG/1
40 TABLET ORAL DAILY
Qty: 30 TABLET | Refills: 0 | Status: SHIPPED | OUTPATIENT
Start: 2021-11-30 | End: 2021-12-22 | Stop reason: SDUPTHER

## 2021-12-16 ENCOUNTER — LAB (OUTPATIENT)
Dept: LAB | Facility: HOSPITAL | Age: 61
End: 2021-12-16

## 2021-12-16 DIAGNOSIS — E55.9 VITAMIN D DEFICIENCY: ICD-10-CM

## 2021-12-16 DIAGNOSIS — E66.3 OVERWEIGHT: ICD-10-CM

## 2021-12-16 DIAGNOSIS — E78.2 MIXED HYPERLIPIDEMIA: ICD-10-CM

## 2021-12-16 DIAGNOSIS — I10 ESSENTIAL HYPERTENSION: ICD-10-CM

## 2021-12-16 DIAGNOSIS — R73.03 PREDIABETES: ICD-10-CM

## 2021-12-16 LAB
25(OH)D3 SERPL-MCNC: 31.2 NG/ML
ALBUMIN SERPL-MCNC: 4.2 G/DL (ref 3.5–5.2)
ALBUMIN/GLOB SERPL: 1.3 G/DL
ALP SERPL-CCNC: 52 U/L (ref 39–117)
ALT SERPL W P-5'-P-CCNC: 13 U/L (ref 1–41)
ANION GAP SERPL CALCULATED.3IONS-SCNC: 9.4 MMOL/L (ref 5–15)
AST SERPL-CCNC: 17 U/L (ref 1–40)
BASOPHILS # BLD AUTO: 0.05 10*3/MM3 (ref 0–0.2)
BASOPHILS NFR BLD AUTO: 0.5 % (ref 0–1.5)
BILIRUB SERPL-MCNC: 0.8 MG/DL (ref 0–1.2)
BUN SERPL-MCNC: 13 MG/DL (ref 8–23)
BUN/CREAT SERPL: 11.5 (ref 7–25)
CALCIUM SPEC-SCNC: 9.1 MG/DL (ref 8.6–10.5)
CHLORIDE SERPL-SCNC: 106 MMOL/L (ref 98–107)
CHOLEST SERPL-MCNC: 135 MG/DL (ref 0–200)
CO2 SERPL-SCNC: 24.6 MMOL/L (ref 22–29)
CREAT SERPL-MCNC: 1.13 MG/DL (ref 0.76–1.27)
DEPRECATED RDW RBC AUTO: 43.8 FL (ref 37–54)
EOSINOPHIL # BLD AUTO: 0.13 10*3/MM3 (ref 0–0.4)
EOSINOPHIL NFR BLD AUTO: 1.4 % (ref 0.3–6.2)
ERYTHROCYTE [DISTWIDTH] IN BLOOD BY AUTOMATED COUNT: 13.8 % (ref 12.3–15.4)
GFR SERPL CREATININE-BSD FRML MDRD: 79 ML/MIN/1.73
GLOBULIN UR ELPH-MCNC: 3.3 GM/DL
GLUCOSE SERPL-MCNC: 101 MG/DL (ref 65–99)
HBA1C MFR BLD: 5.67 % (ref 4.8–5.6)
HCT VFR BLD AUTO: 45 % (ref 37.5–51)
HDLC SERPL-MCNC: 46 MG/DL (ref 40–60)
HGB BLD-MCNC: 15.4 G/DL (ref 13–17.7)
IMM GRANULOCYTES # BLD AUTO: 0.03 10*3/MM3 (ref 0–0.05)
IMM GRANULOCYTES NFR BLD AUTO: 0.3 % (ref 0–0.5)
LDLC SERPL CALC-MCNC: 75 MG/DL (ref 0–100)
LDLC/HDLC SERPL: 1.64 {RATIO}
LYMPHOCYTES # BLD AUTO: 2.1 10*3/MM3 (ref 0.7–3.1)
LYMPHOCYTES NFR BLD AUTO: 23 % (ref 19.6–45.3)
MCH RBC QN AUTO: 29.6 PG (ref 26.6–33)
MCHC RBC AUTO-ENTMCNC: 34.2 G/DL (ref 31.5–35.7)
MCV RBC AUTO: 86.5 FL (ref 79–97)
MONOCYTES # BLD AUTO: 0.68 10*3/MM3 (ref 0.1–0.9)
MONOCYTES NFR BLD AUTO: 7.4 % (ref 5–12)
NEUTROPHILS NFR BLD AUTO: 6.15 10*3/MM3 (ref 1.7–7)
NEUTROPHILS NFR BLD AUTO: 67.4 % (ref 42.7–76)
NRBC BLD AUTO-RTO: 0 /100 WBC (ref 0–0.2)
PLATELET # BLD AUTO: 210 10*3/MM3 (ref 140–450)
PMV BLD AUTO: 12.2 FL (ref 6–12)
POTASSIUM SERPL-SCNC: 4 MMOL/L (ref 3.5–5.2)
PROT SERPL-MCNC: 7.5 G/DL (ref 6–8.5)
RBC # BLD AUTO: 5.2 10*6/MM3 (ref 4.14–5.8)
SODIUM SERPL-SCNC: 140 MMOL/L (ref 136–145)
TRIGL SERPL-MCNC: 67 MG/DL (ref 0–150)
VLDLC SERPL-MCNC: 14 MG/DL (ref 5–40)
WBC NRBC COR # BLD: 9.14 10*3/MM3 (ref 3.4–10.8)

## 2021-12-16 PROCEDURE — 80061 LIPID PANEL: CPT

## 2021-12-16 PROCEDURE — 85025 COMPLETE CBC W/AUTO DIFF WBC: CPT

## 2021-12-16 PROCEDURE — 82306 VITAMIN D 25 HYDROXY: CPT

## 2021-12-16 PROCEDURE — 83036 HEMOGLOBIN GLYCOSYLATED A1C: CPT

## 2021-12-16 PROCEDURE — 80053 COMPREHEN METABOLIC PANEL: CPT

## 2021-12-17 ENCOUNTER — TELEPHONE (OUTPATIENT)
Dept: FAMILY MEDICINE CLINIC | Facility: CLINIC | Age: 61
End: 2021-12-17

## 2021-12-17 NOTE — TELEPHONE ENCOUNTER
----- Message from Manuel Andersen MD sent at 12/17/2021  7:30 AM CST -----  Please let pt know labwork stable     Vitamin D at 31.2     Pt is borderline predaibetic with hga1c at 5.67. continue to watch sugar and carb intake     Lipid panel stable    Recheck on next visit thanks

## 2021-12-22 ENCOUNTER — OFFICE VISIT (OUTPATIENT)
Dept: FAMILY MEDICINE CLINIC | Facility: CLINIC | Age: 61
End: 2021-12-22

## 2021-12-22 VITALS
TEMPERATURE: 97.6 F | SYSTOLIC BLOOD PRESSURE: 132 MMHG | OXYGEN SATURATION: 99 % | HEIGHT: 66 IN | BODY MASS INDEX: 28.48 KG/M2 | DIASTOLIC BLOOD PRESSURE: 78 MMHG | HEART RATE: 70 BPM | WEIGHT: 177.2 LBS

## 2021-12-22 DIAGNOSIS — R20.0 NUMBNESS AND TINGLING IN LEFT ARM: Primary | ICD-10-CM

## 2021-12-22 DIAGNOSIS — N18.2 STAGE 2 CHRONIC KIDNEY DISEASE: ICD-10-CM

## 2021-12-22 DIAGNOSIS — R20.2 NUMBNESS AND TINGLING IN LEFT ARM: Primary | ICD-10-CM

## 2021-12-22 DIAGNOSIS — R73.03 PREDIABETES: ICD-10-CM

## 2021-12-22 DIAGNOSIS — I10 ESSENTIAL HYPERTENSION: ICD-10-CM

## 2021-12-22 DIAGNOSIS — E55.9 VITAMIN D DEFICIENCY: ICD-10-CM

## 2021-12-22 DIAGNOSIS — E66.3 OVERWEIGHT: ICD-10-CM

## 2021-12-22 PROCEDURE — 99214 OFFICE O/P EST MOD 30 MIN: CPT | Performed by: FAMILY MEDICINE

## 2021-12-22 RX ORDER — LISINOPRIL 40 MG/1
40 TABLET ORAL DAILY
Qty: 30 TABLET | Refills: 0 | Status: SHIPPED | OUTPATIENT
Start: 2021-12-22 | End: 2022-02-01

## 2022-01-13 ENCOUNTER — TELEPHONE (OUTPATIENT)
Dept: FAMILY MEDICINE CLINIC | Facility: CLINIC | Age: 62
End: 2022-01-13

## 2022-01-13 NOTE — TELEPHONE ENCOUNTER
Called and left a voicemail that Dr Wetzel's office was trying to reach him and if he would like to proceed with the referral to give our office a call.

## 2022-02-01 RX ORDER — LISINOPRIL 40 MG/1
40 TABLET ORAL DAILY
Qty: 30 TABLET | Refills: 2 | Status: SHIPPED | OUTPATIENT
Start: 2022-02-01 | End: 2022-05-31

## 2022-02-28 ENCOUNTER — TELEPHONE (OUTPATIENT)
Dept: FAMILY MEDICINE CLINIC | Facility: CLINIC | Age: 62
End: 2022-02-28

## 2022-05-31 RX ORDER — LISINOPRIL 40 MG/1
40 TABLET ORAL DAILY
Qty: 30 TABLET | Refills: 2 | Status: SHIPPED | OUTPATIENT
Start: 2022-05-31 | End: 2022-08-25 | Stop reason: SDUPTHER

## 2022-08-18 NOTE — PROGRESS NOTES
Subjective:  Amos Miranda is a 62 y.o. male who presents for       Patient Active Problem List   Diagnosis   • Need for hepatitis C screening test   • Hyperlipidemia   • Essential hypertension   • Low back pain without sciatica   • Right shoulder pain   • Leukocytosis   • Bursitis, shoulder   • Cough   • Neutrophilia   • Renal impairment   • Upper respiratory tract infection   • Right-sided low back pain with right-sided sciatica   • Prediabetes   • Arthritis, lumbar spine   • Vitamin D deficiency   • Overweight   • Stage 2 chronic kidney disease           Current Outpatient Medications:   •  lisinopril (PRINIVIL,ZESTRIL) 40 MG tablet, Take 1 tablet by mouth Daily., Disp: 30 tablet, Rfl: 2  •  RA ASPIRIN ADULT LOW STRENGTH 81 MG chewable tablet, chew and swallow 1 tablet by mouth once daily, Disp: 30 tablet, Rfl: 6  •  Turmeric (QC Tumeric Complex) 500 MG capsule, Take 500 mg by mouth Daily., Disp: 30 capsule, Rfl: 3      Pt is 63 yo male with management of HTN, HLP,  Right shoulder pain, low back pain with sciatica, ED,Vitamin D deficiency, CKD         12/22/21 in office visit for recheck on pt's above medical issues. Pt had labwork done on 12/16/21 that showed stable lipid panel Vitamin D was at 31. hga1c was at 5.67. CMP showed GFR at 79 and normal liver enzymes.  CBC showed stable hemoglobin and WBC. Pt continues to take his medications for ED, HTN. Pt states he is doing well overall. No chest pain no dizziness. He does have numbness and tingling starting from his left shoulder blade and radiates down his left arm. He does not have pain.      8/25/22 in office visit for recheck. Pt had labwork done on 8/22/22 that showed lipid panel vitamin D and b12 stsable hga1c is at 5.90 from 5.67. CMP showed GFR at 66.9. CBC showed stable hemoglobin and wBC. He saw NEurology and had EMG study recently on left arm/LUE that was normal. He does have tingling in his right arm.  He states his right shoulder has  been bothering him.  It hurts when he lifts his right arm        Shoulder Injury   The right shoulder is affected. There was no injury mechanism. The quality of the pain is described as aching. The pain does not radiate. The pain is at a severity of 4/10. The pain is moderate. Associated symptoms include numbness. Pertinent negatives include no chest pain. Nothing aggravates the symptoms. He has tried nothing for the symptoms. The treatment provided no relief.    Neurologic Problem  The patient's primary symptoms include an altered mental status, focal sensory loss and focal weakness. The patient's pertinent negatives include no clumsiness, loss of balance, memory loss, slurred speech, syncope, visual change or weakness. Primary symptoms comment: left arm numbness/tingling. hand tingling . This is a new problem. The problem is unchanged. There was no focality noted. Associated symptoms include shortness of breath. Pertinent negatives include no abdominal pain, auditory change, aura, back pain, bladder incontinence, bowel incontinence, confusion, diaphoresis, dizziness, fatigue, fever, headaches, light-headedness, nausea, neck pain or palpitations. Past treatments include nothing. The treatment provided no relief. There is no history of a bleeding disorder, a clotting disorder, a CVA, dementia, head trauma, liver disease, mood changes or seizures.   Chronic Kidney Disease  This is a chronic problem. The current episode started more than 1 year ago. The problem occurs constantly. The problem has been unchanged. Associated symptoms include arthralgias. Pertinent negatives include no abdominal pain, anorexia, change in bowel habit, chest pain, chills, congestion, coughing, diaphoresis, fatigue, fever, headaches, joint swelling, myalgias, nausea, neck pain, numbness, rash, sore throat, swollen glands, urinary symptoms, vertigo, visual change, vomiting or weakness. Nothing aggravates the symptoms. He has tried nothing for  the symptoms. The treatment provided no relief.    Hypertension   This is a chronic problem. The current episode started more than 1 year ago. The problem has beenimproving  since onset. The problem is uncontrolled. Pertinent negatives include no anxiety, blurred vision, chest pain, headaches, malaise/fatigue, neck pain, orthopnea, palpitations, peripheral edema, PND, shortness of breath or sweats. Risk factors for coronary artery disease include dyslipidemia and male gender. Past treatments include ACE inhibitors and diuretics. Current antihypertension treatment includes diuretics and ACE inhibitors. The current treatment provides no improvement. There are no compliance problems.  There is no history of angina, kidney disease, CAD/MI, CVA, heart failure, left ventricular hypertrophy, PVD or retinopathy. There is no history of chronic renal disease, coarctation of the aorta, hyperaldosteronism, hypercortisolism, a hypertension causing med, pheochromocytoma, renovascular disease or sleep apnea.    Review of Systems  Review of Systems   Constitutional: Positive for activity change and fatigue. Negative for appetite change, chills, diaphoresis and fever.   HENT: Negative for congestion, postnasal drip, rhinorrhea, sinus pressure, sinus pain, sneezing, sore throat, trouble swallowing and voice change.    Respiratory: Negative for cough, choking, chest tightness, shortness of breath, wheezing and stridor.    Cardiovascular: Negative for chest pain.   Gastrointestinal: Negative for diarrhea, nausea and vomiting.   Musculoskeletal: Positive for arthralgias.   Neurological: Positive for weakness and numbness. Negative for headaches.       Patient Active Problem List   Diagnosis   • Need for hepatitis C screening test   • Hyperlipidemia   • Essential hypertension   • Low back pain without sciatica   • Right shoulder pain   • Leukocytosis   • Bursitis, shoulder   • Cough   • Neutrophilia   • Renal impairment   • Upper  respiratory tract infection   • Right-sided low back pain with right-sided sciatica   • Prediabetes   • Arthritis, lumbar spine   • Vitamin D deficiency   • Overweight   • Stage 2 chronic kidney disease     History reviewed. No pertinent surgical history.  Social History     Socioeconomic History   • Marital status:    Tobacco Use   • Smoking status: Never Smoker   • Smokeless tobacco: Never Used   Vaping Use   • Vaping Use: Never used   Substance and Sexual Activity   • Alcohol use: No   • Drug use: Never     Family History   Problem Relation Age of Onset   • Heart disease Mother    • Cancer Father         lung   • Diabetes Other    • Hypertension Other    • Stroke Other      Lab on 08/22/2022   Component Date Value Ref Range Status   • WBC 08/22/2022 9.17  3.40 - 10.80 10*3/mm3 Final   • RBC 08/22/2022 4.78  4.14 - 5.80 10*6/mm3 Final   • Hemoglobin 08/22/2022 14.2  13.0 - 17.7 g/dL Final   • Hematocrit 08/22/2022 42.7  37.5 - 51.0 % Final   • MCV 08/22/2022 89.3  79.0 - 97.0 fL Final   • MCH 08/22/2022 29.7  26.6 - 33.0 pg Final   • MCHC 08/22/2022 33.3  31.5 - 35.7 g/dL Final   • RDW 08/22/2022 13.5  12.3 - 15.4 % Final   • RDW-SD 08/22/2022 44.1  37.0 - 54.0 fl Final   • MPV 08/22/2022 12.2 (A) 6.0 - 12.0 fL Final   • Platelets 08/22/2022 207  140 - 450 10*3/mm3 Final   • Neutrophil % 08/22/2022 72.3  42.7 - 76.0 % Final   • Lymphocyte % 08/22/2022 19.6  19.6 - 45.3 % Final   • Monocyte % 08/22/2022 6.4  5.0 - 12.0 % Final   • Eosinophil % 08/22/2022 1.0  0.3 - 6.2 % Final   • Basophil % 08/22/2022 0.4  0.0 - 1.5 % Final   • Immature Grans % 08/22/2022 0.3  0.0 - 0.5 % Final   • Neutrophils, Absolute 08/22/2022 6.62  1.70 - 7.00 10*3/mm3 Final   • Lymphocytes, Absolute 08/22/2022 1.80  0.70 - 3.10 10*3/mm3 Final   • Monocytes, Absolute 08/22/2022 0.59  0.10 - 0.90 10*3/mm3 Final   • Eosinophils, Absolute 08/22/2022 0.09  0.00 - 0.40 10*3/mm3 Final   • Basophils, Absolute 08/22/2022 0.04  0.00 - 0.20  10*3/mm3 Final   • Immature Grans, Absolute 08/22/2022 0.03  0.00 - 0.05 10*3/mm3 Final   • nRBC 08/22/2022 0.0  0.0 - 0.2 /100 WBC Final   • Glucose 08/22/2022 93  65 - 99 mg/dL Final   • BUN 08/22/2022 11  8 - 23 mg/dL Final   • Creatinine 08/22/2022 1.21  0.76 - 1.27 mg/dL Final   • Sodium 08/22/2022 143  136 - 145 mmol/L Final   • Potassium 08/22/2022 4.9  3.5 - 5.2 mmol/L Final   • Chloride 08/22/2022 107  98 - 107 mmol/L Final   • CO2 08/22/2022 27.0  22.0 - 29.0 mmol/L Final   • Calcium 08/22/2022 9.2  8.6 - 10.5 mg/dL Final   • Total Protein 08/22/2022 6.8  6.0 - 8.5 g/dL Final   • Albumin 08/22/2022 4.10  3.50 - 5.20 g/dL Final   • ALT (SGPT) 08/22/2022 20  1 - 41 U/L Final   • AST (SGOT) 08/22/2022 18  1 - 40 U/L Final   • Alkaline Phosphatase 08/22/2022 50  39 - 117 U/L Final   • Total Bilirubin 08/22/2022 0.8  0.0 - 1.2 mg/dL Final   • Globulin 08/22/2022 2.7  gm/dL Final   • A/G Ratio 08/22/2022 1.5  g/dL Final   • BUN/Creatinine Ratio 08/22/2022 9.1  7.0 - 25.0 Final   • Anion Gap 08/22/2022 9.0  5.0 - 15.0 mmol/L Final   • eGFR 08/22/2022 66.9  >60.0 mL/min/1.73 Final    National Kidney Foundation and American Society of Nephrology (ASN) Task Force recommended calculation based on the Chronic Kidney Disease Epidemiology Collaboration (CKD-EPI) equation refit without adjustment for race.   • Hemoglobin A1C 08/22/2022 5.90 (A) 4.80 - 5.60 % Final   • Total Cholesterol 08/22/2022 120  0 - 200 mg/dL Final   • Triglycerides 08/22/2022 71  0 - 150 mg/dL Final   • HDL Cholesterol 08/22/2022 43  40 - 60 mg/dL Final   • LDL Cholesterol  08/22/2022 62  0 - 100 mg/dL Final   • VLDL Cholesterol 08/22/2022 15  5 - 40 mg/dL Final   • LDL/HDL Ratio 08/22/2022 1.46   Final   • 25 Hydroxy, Vitamin D 08/22/2022 31.3  30.0 - 100.0 ng/ml Final   • Vitamin B-12 08/22/2022 495  211 - 946 pg/mL Final      XR Spine Lumbar Complete 4+VW  Narrative: Procedure:  Lumbar spine        Indication:  Lower back pain   .    Technique:  " Five views   .    Prior relevant exam:  None.    Small anterior osteophytes at L4-L5. Mild degenerative changes of  the apophyseal joints at L5-S1.    Lumbar spine is otherwise unremarkable.  Impression: CONCLUSION: Mild degenerative changes L4-L5, L5-S1. Otherwise  unremarkable lumbar spine.    Electronically signed by:  Chung Alvarez MD  1/31/2020 12:52 PM CST  Workstation: [email or web address]    [email or web address]  Immunization History   Administered Date(s) Administered   • COVID-19 (MODERNA) 1st, 2nd, 3rd Dose Only 05/13/2021, 06/10/2021   • FluLaval/Fluzone >6mos 11/01/2019   • Tetanus 01/01/2012   • influenza Split 01/01/2015       The following portions of the patient's history were reviewed and updated as appropriate: allergies, current medications, past family history, past medical history, past social history, past surgical history and problem list.        Physical Exam  /74 (BP Location: Right arm, Patient Position: Sitting, Cuff Size: Adult)   Pulse 72   Temp 98.2 °F (36.8 °C)   Ht 167.6 cm (66\")   Wt 81.8 kg (180 lb 6.4 oz)   SpO2 97%   BMI 29.12 kg/m²     Physical Exam  Vitals and nursing note reviewed.   Constitutional:       Appearance: He is well-developed. He is not diaphoretic.   HENT:      Head: Normocephalic and atraumatic.      Right Ear: External ear normal.   Eyes:      Conjunctiva/sclera: Conjunctivae normal.      Pupils: Pupils are equal, round, and reactive to light.   Cardiovascular:      Rate and Rhythm: Normal rate and regular rhythm.      Heart sounds: Normal heart sounds. No murmur heard.  Pulmonary:      Effort: Pulmonary effort is normal. No respiratory distress.      Breath sounds: Normal breath sounds.   Abdominal:      General: Bowel sounds are normal. There is no distension.      Palpations: Abdomen is soft.      Tenderness: There is no abdominal tenderness.   Musculoskeletal:         General: Tenderness present. No deformity.      Right shoulder: Tenderness and bony tenderness " present. Decreased range of motion.      Cervical back: Normal range of motion and neck supple.   Skin:     General: Skin is warm.      Coloration: Skin is not pale.      Findings: No erythema or rash.   Neurological:      Mental Status: He is alert and oriented to person, place, and time.      Cranial Nerves: No cranial nerve deficit.   Psychiatric:         Behavior: Behavior normal.         [unfilled]   Diagnosis Plan   1. Right shoulder pain, unspecified chronicity  XR Shoulder 2+ View Right   2. Overweight     3. Mixed hyperlipidemia     4. Essential hypertension     5. Vitamin D deficiency     6. Prediabetes     7. Stage 2 chronic kidney disease                -went over labwork   -recommend shingles vaccination   -will get last colonscopy screening from Dr. Quinn   -right shoulder pain - x-ray of right shoulder. Possible AC joint issue. Consider Orthopedic referral   -numbness/tingling in left arm - refer to Neurology for EMG study. Advised pt to see Neurology to get right arm tested.    -CKD stage 2 -gave information today.      -ED - on cialis   -overweight - counseled weight loss >5 minutes BMI at 29.12   -vitamin D deficiency - check vitamin D levels   -prediabetes prediabetes meal plan    -HTN -stable lisionpril 40 mgdaily.  stop chlorathalidone   -HLP -  Off lipitor 40 mg PO qhs. Recommend heart healthy diet   -advised pt to go to ER or call 911 if symptoms worrisome or severe  -advised pt to followup with specialist and referrals   -advised pt to be safe and call with questions and concerns   -advised pt to be safe during COVID-19 pandemic   I spent 30  minutes caring for Amos on this date of service. This time includes time spent by me in the following activities: preparing for the visit, reviewing tests, obtaining and/or reviewing a separately obtained history, performing a medically appropriate examination and/or evaluation, counseling and educating the patient/family/caregiver, ordering  medications, tests, or procedures, referring and communicating with other health care professionals, documenting information in the medical record, independently interpreting results and communicating that information with the patient/family/caregiver and care coordination.   -recheck in 3 months         This document has been electronically signed by Manuel Andersen MD on August 25, 2022 15:59 CDT

## 2022-08-22 ENCOUNTER — LAB (OUTPATIENT)
Dept: LAB | Facility: HOSPITAL | Age: 62
End: 2022-08-22

## 2022-08-22 DIAGNOSIS — E55.9 VITAMIN D DEFICIENCY: ICD-10-CM

## 2022-08-22 DIAGNOSIS — N18.2 STAGE 2 CHRONIC KIDNEY DISEASE: ICD-10-CM

## 2022-08-22 DIAGNOSIS — I10 ESSENTIAL HYPERTENSION: ICD-10-CM

## 2022-08-22 DIAGNOSIS — E66.3 OVERWEIGHT: ICD-10-CM

## 2022-08-22 DIAGNOSIS — R20.0 NUMBNESS AND TINGLING IN LEFT ARM: ICD-10-CM

## 2022-08-22 DIAGNOSIS — R20.2 NUMBNESS AND TINGLING IN LEFT ARM: ICD-10-CM

## 2022-08-22 DIAGNOSIS — R73.03 PREDIABETES: ICD-10-CM

## 2022-08-22 LAB
25(OH)D3 SERPL-MCNC: 31.3 NG/ML (ref 30–100)
ALBUMIN SERPL-MCNC: 4.1 G/DL (ref 3.5–5.2)
ALBUMIN/GLOB SERPL: 1.5 G/DL
ALP SERPL-CCNC: 50 U/L (ref 39–117)
ALT SERPL W P-5'-P-CCNC: 20 U/L (ref 1–41)
ANION GAP SERPL CALCULATED.3IONS-SCNC: 9 MMOL/L (ref 5–15)
AST SERPL-CCNC: 18 U/L (ref 1–40)
BASOPHILS # BLD AUTO: 0.04 10*3/MM3 (ref 0–0.2)
BASOPHILS NFR BLD AUTO: 0.4 % (ref 0–1.5)
BILIRUB SERPL-MCNC: 0.8 MG/DL (ref 0–1.2)
BUN SERPL-MCNC: 11 MG/DL (ref 8–23)
BUN/CREAT SERPL: 9.1 (ref 7–25)
CALCIUM SPEC-SCNC: 9.2 MG/DL (ref 8.6–10.5)
CHLORIDE SERPL-SCNC: 107 MMOL/L (ref 98–107)
CHOLEST SERPL-MCNC: 120 MG/DL (ref 0–200)
CO2 SERPL-SCNC: 27 MMOL/L (ref 22–29)
CREAT SERPL-MCNC: 1.21 MG/DL (ref 0.76–1.27)
DEPRECATED RDW RBC AUTO: 44.1 FL (ref 37–54)
EGFRCR SERPLBLD CKD-EPI 2021: 66.9 ML/MIN/1.73
EOSINOPHIL # BLD AUTO: 0.09 10*3/MM3 (ref 0–0.4)
EOSINOPHIL NFR BLD AUTO: 1 % (ref 0.3–6.2)
ERYTHROCYTE [DISTWIDTH] IN BLOOD BY AUTOMATED COUNT: 13.5 % (ref 12.3–15.4)
GLOBULIN UR ELPH-MCNC: 2.7 GM/DL
GLUCOSE SERPL-MCNC: 93 MG/DL (ref 65–99)
HBA1C MFR BLD: 5.9 % (ref 4.8–5.6)
HCT VFR BLD AUTO: 42.7 % (ref 37.5–51)
HDLC SERPL-MCNC: 43 MG/DL (ref 40–60)
HGB BLD-MCNC: 14.2 G/DL (ref 13–17.7)
IMM GRANULOCYTES # BLD AUTO: 0.03 10*3/MM3 (ref 0–0.05)
IMM GRANULOCYTES NFR BLD AUTO: 0.3 % (ref 0–0.5)
LDLC SERPL CALC-MCNC: 62 MG/DL (ref 0–100)
LDLC/HDLC SERPL: 1.46 {RATIO}
LYMPHOCYTES # BLD AUTO: 1.8 10*3/MM3 (ref 0.7–3.1)
LYMPHOCYTES NFR BLD AUTO: 19.6 % (ref 19.6–45.3)
MCH RBC QN AUTO: 29.7 PG (ref 26.6–33)
MCHC RBC AUTO-ENTMCNC: 33.3 G/DL (ref 31.5–35.7)
MCV RBC AUTO: 89.3 FL (ref 79–97)
MONOCYTES # BLD AUTO: 0.59 10*3/MM3 (ref 0.1–0.9)
MONOCYTES NFR BLD AUTO: 6.4 % (ref 5–12)
NEUTROPHILS NFR BLD AUTO: 6.62 10*3/MM3 (ref 1.7–7)
NEUTROPHILS NFR BLD AUTO: 72.3 % (ref 42.7–76)
NRBC BLD AUTO-RTO: 0 /100 WBC (ref 0–0.2)
PLATELET # BLD AUTO: 207 10*3/MM3 (ref 140–450)
PMV BLD AUTO: 12.2 FL (ref 6–12)
POTASSIUM SERPL-SCNC: 4.9 MMOL/L (ref 3.5–5.2)
PROT SERPL-MCNC: 6.8 G/DL (ref 6–8.5)
RBC # BLD AUTO: 4.78 10*6/MM3 (ref 4.14–5.8)
SODIUM SERPL-SCNC: 143 MMOL/L (ref 136–145)
TRIGL SERPL-MCNC: 71 MG/DL (ref 0–150)
VIT B12 BLD-MCNC: 495 PG/ML (ref 211–946)
VLDLC SERPL-MCNC: 15 MG/DL (ref 5–40)
WBC NRBC COR # BLD: 9.17 10*3/MM3 (ref 3.4–10.8)

## 2022-08-22 PROCEDURE — 80061 LIPID PANEL: CPT

## 2022-08-22 PROCEDURE — 82306 VITAMIN D 25 HYDROXY: CPT

## 2022-08-22 PROCEDURE — 82607 VITAMIN B-12: CPT

## 2022-08-22 PROCEDURE — 83036 HEMOGLOBIN GLYCOSYLATED A1C: CPT

## 2022-08-22 PROCEDURE — 85025 COMPLETE CBC W/AUTO DIFF WBC: CPT

## 2022-08-22 PROCEDURE — 80053 COMPREHEN METABOLIC PANEL: CPT

## 2022-08-25 ENCOUNTER — OFFICE VISIT (OUTPATIENT)
Dept: FAMILY MEDICINE CLINIC | Facility: CLINIC | Age: 62
End: 2022-08-25

## 2022-08-25 VITALS
HEART RATE: 72 BPM | SYSTOLIC BLOOD PRESSURE: 126 MMHG | WEIGHT: 180.4 LBS | OXYGEN SATURATION: 97 % | DIASTOLIC BLOOD PRESSURE: 74 MMHG | HEIGHT: 66 IN | BODY MASS INDEX: 28.99 KG/M2 | TEMPERATURE: 98.2 F

## 2022-08-25 DIAGNOSIS — E55.9 VITAMIN D DEFICIENCY: ICD-10-CM

## 2022-08-25 DIAGNOSIS — M25.511 RIGHT SHOULDER PAIN, UNSPECIFIED CHRONICITY: Primary | ICD-10-CM

## 2022-08-25 DIAGNOSIS — E66.3 OVERWEIGHT: ICD-10-CM

## 2022-08-25 DIAGNOSIS — I10 ESSENTIAL HYPERTENSION: ICD-10-CM

## 2022-08-25 DIAGNOSIS — E78.2 MIXED HYPERLIPIDEMIA: ICD-10-CM

## 2022-08-25 DIAGNOSIS — R73.03 PREDIABETES: ICD-10-CM

## 2022-08-25 DIAGNOSIS — N18.2 STAGE 2 CHRONIC KIDNEY DISEASE: ICD-10-CM

## 2022-08-25 PROCEDURE — 99214 OFFICE O/P EST MOD 30 MIN: CPT | Performed by: FAMILY MEDICINE

## 2022-08-25 RX ORDER — LISINOPRIL 40 MG/1
40 TABLET ORAL DAILY
Qty: 30 TABLET | Refills: 2 | Status: SHIPPED | OUTPATIENT
Start: 2022-08-25 | End: 2022-11-22 | Stop reason: SDUPTHER

## 2022-08-26 ENCOUNTER — TELEPHONE (OUTPATIENT)
Dept: FAMILY MEDICINE CLINIC | Facility: CLINIC | Age: 62
End: 2022-08-26

## 2022-08-26 NOTE — TELEPHONE ENCOUNTER
----- Message from Manuel Andersen MD sent at 8/22/2022  8:18 PM CDT -----  Please let pt know labwork stable other than hga1c now at 5.90.  pt is closer to being diabetic. Recommend pt watch sugar and carb intake    On kidney function GFR in 60s. Pt has CKD stage 2. Continue with BP control and increased water intake    Recheck on next visit thanks

## 2022-08-30 ENCOUNTER — TELEPHONE (OUTPATIENT)
Dept: FAMILY MEDICINE CLINIC | Facility: CLINIC | Age: 62
End: 2022-08-30

## 2022-08-30 DIAGNOSIS — M19.019 AC JOINT ARTHROPATHY: ICD-10-CM

## 2022-08-30 DIAGNOSIS — M25.511 RIGHT SHOULDER PAIN, UNSPECIFIED CHRONICITY: Primary | ICD-10-CM

## 2022-08-30 NOTE — TELEPHONE ENCOUNTER
----- Message from Manuel Andersen MD sent at 8/29/2022  7:02 PM CDT -----  Please let pt know on x-ray of shoulder he has minimal hypertrophic changes in AC joint or degenerative changes. He also has present bone spur  6mm  in size within shoulder region    Recommend Orthopedic referral if pt agreeable I will put in referral    Recheck on next visit thanks   Please let pt know labwork stable other than hga1c now at 5.90.  pt is closer to being diabetic. Recommend pt watch sugar and carb intake     On kidney function GFR in 60s. Pt has CKD stage 2. Continue with BP control and increased water intake

## 2022-09-26 ENCOUNTER — OFFICE VISIT (OUTPATIENT)
Dept: ORTHOPEDIC SURGERY | Facility: CLINIC | Age: 62
End: 2022-09-26

## 2022-09-26 VITALS — BODY MASS INDEX: 28.93 KG/M2 | HEIGHT: 66 IN | WEIGHT: 180 LBS

## 2022-09-26 DIAGNOSIS — M75.81 ROTATOR CUFF TENDINITIS, RIGHT: ICD-10-CM

## 2022-09-26 DIAGNOSIS — G89.29 CHRONIC RIGHT SHOULDER PAIN: Primary | ICD-10-CM

## 2022-09-26 DIAGNOSIS — M75.51 SUBACROMIAL BURSITIS OF RIGHT SHOULDER JOINT: ICD-10-CM

## 2022-09-26 DIAGNOSIS — M25.511 CHRONIC RIGHT SHOULDER PAIN: Primary | ICD-10-CM

## 2022-09-26 PROCEDURE — 20610 DRAIN/INJ JOINT/BURSA W/O US: CPT | Performed by: NURSE PRACTITIONER

## 2022-09-26 PROCEDURE — 99203 OFFICE O/P NEW LOW 30 MIN: CPT | Performed by: NURSE PRACTITIONER

## 2022-09-26 RX ORDER — TRIAMCINOLONE ACETONIDE 40 MG/ML
40 INJECTION, SUSPENSION INTRA-ARTICULAR; INTRAMUSCULAR
Status: COMPLETED | OUTPATIENT
Start: 2022-09-26 | End: 2022-09-26

## 2022-09-26 RX ORDER — LIDOCAINE HYDROCHLORIDE 10 MG/ML
2 INJECTION, SOLUTION INFILTRATION; PERINEURAL
Status: COMPLETED | OUTPATIENT
Start: 2022-09-26 | End: 2022-09-26

## 2022-09-26 RX ADMIN — TRIAMCINOLONE ACETONIDE 40 MG: 40 INJECTION, SUSPENSION INTRA-ARTICULAR; INTRAMUSCULAR at 10:44

## 2022-09-26 RX ADMIN — LIDOCAINE HYDROCHLORIDE 2 ML: 10 INJECTION, SOLUTION INFILTRATION; PERINEURAL at 10:44

## 2022-09-26 NOTE — PROGRESS NOTES
Amos Miranda is a 62 y.o. male   Primary provider:  Manuel Andersen MD       Chief Complaint   Patient presents with   • Right Shoulder - Pain   • Establish Care       HISTORY OF PRESENT ILLNESS:    Mr. Miranda is a 62-year-old male who presents today with complaints of chronic right shoulder pain that has been present over the past year.  Pain is worse with certain movements such as overhead activity.  Pain is worse with direct pressure when he tries to sleep on affected side.  He denies injury or traumas.  No fever, nausea, vomiting.  No complaints of burning, tingling, numbness today.  He describes his pain as mild and constant.  Pain occasionally is stabbing and associated with popping.  He has tried rice therapy with some relief in symptoms.  He has stage II chronic kidney disease.  He had recent x-rays which did not show acute bony abnormality.  Patient works as a  and does repetitive movements all day at work.    Pain  This is a chronic problem. The current episode started more than 1 year ago. The problem occurs constantly. Associated symptoms include joint swelling. Associated symptoms comments: Stabbing. clicking. He has tried ice, heat and rest for the symptoms.        CONCURRENT MEDICAL HISTORY:    Past Medical History:   Diagnosis Date   • Dyslipidemia    • Dysuria    • Encounter for screening for diabetes mellitus    • Encounter for screening for lipoid disorders    • Encounter for screening for malignant neoplasm of colon    • Essential hypertension    • Generalized aches and pains    • Headache    • Injury of back    • Kidney disease    • Lateral epicondylitis     R>L   • Male erectile dysfunction, unspecifed    • Migraine    • Nocturia        No Known Allergies      Current Outpatient Medications:   •  lisinopril (PRINIVIL,ZESTRIL) 40 MG tablet, Take 1 tablet by mouth Daily., Disp: 30 tablet, Rfl: 2  •  RA ASPIRIN ADULT LOW STRENGTH 81 MG chewable tablet, chew and swallow 1  "tablet by mouth once daily, Disp: 30 tablet, Rfl: 6    History reviewed. No pertinent surgical history.    Family History   Problem Relation Age of Onset   • Heart disease Mother    • Cancer Father         lung   • Diabetes Other    • Hypertension Other    • Stroke Other    • Lung disease Other         Social History     Socioeconomic History   • Marital status:    Tobacco Use   • Smoking status: Never Smoker   • Smokeless tobacco: Never Used   Vaping Use   • Vaping Use: Never used   Substance and Sexual Activity   • Alcohol use: No   • Drug use: Never        Review of Systems   Musculoskeletal: Positive for joint swelling.        Right shoulder pain   All other systems reviewed and are negative.      PHYSICAL EXAMINATION:       Ht 167.6 cm (66\")   Wt 81.6 kg (180 lb)   BMI 29.05 kg/m²     Physical Exam  Vitals and nursing note reviewed.   Constitutional:       General: He is not in acute distress.     Appearance: He is well-developed. He is not toxic-appearing.   HENT:      Head: Normocephalic.   Pulmonary:      Effort: Pulmonary effort is normal. No respiratory distress.   Skin:     General: Skin is warm and dry.   Neurological:      Mental Status: He is alert and oriented to person, place, and time.   Psychiatric:         Mood and Affect: Mood normal.         Behavior: Behavior normal.         Thought Content: Thought content normal.         Judgment: Judgment normal.         GAIT:     [x]  Normal  []  Antalgic    Assistive device: [x]  None  []  Walker     []  Crutches  []  Cane     []  Wheelchair  []  Stretcher    Right Shoulder Exam     Tenderness   The patient is experiencing tenderness in the acromion.    Range of Motion   Active abduction: 120   Extension: normal   External rotation: normal   Forward flexion: normal   Internal rotation 90 degrees: normal     Tests   Impingement: negative    Other   Erythema: absent  Sensation: normal  Pulse: present    Comments:  Positive full can test.  Negative " empty can test  No evidence of infection.              No results found.       Three view right shoulder     HISTORY: Right shoulder pain     AP films with the humerus in internal and external rotation and  scapular Y view were obtained.     COMPARISON: March 6, 2017     FINDINGS:   No fracture or dislocation.  Minimal hypertrophic change acromioclavicular joint.  6 mm spur coracoid process.  No other osseous or articular abnormality.     IMPRESSION:  CONCLUSION:  Minimal hypertrophic change acromioclavicular joint.  6 mm spur coracoid process.     [id number]     Electronically signed by:  Joshua Willett MD  8/29/2022 6:38 PM CDT  Workstation: [id number]    ASSESSMENT:    Diagnoses and all orders for this visit:    Chronic right shoulder pain    Rotator cuff tendinitis, right    Subacromial bursitis of right shoulder joint    Other orders  -     Large Joint Arthrocentesis          PLAN    Large Joint Arthrocentesis: R subacromial bursa  Date/Time: 9/26/2022 10:44 AM  Consent given by: patient  Site marked: site marked  Timeout: Immediately prior to procedure a time out was called to verify the correct patient, procedure, equipment, support staff and site/side marked as required   Supporting Documentation  Indications: pain   Procedure Details  Location: shoulder - R subacromial bursa  Preparation: Patient was prepped and draped in the usual sterile fashion  Needle size: 22 G  Approach: posterior  Medications administered: 40 mg triamcinolone acetonide 40 MG/ML; 2 mL lidocaine 1 %  Patient tolerance: patient tolerated the procedure well with no immediate complications              Clinical history and exam suggest rotator cuff tendinitis and bursitis.  After discussing risk, benefits, alternatives, patient desires to proceed with subacromial injection.  Patient tolerated injection well.  Patient also given HEP.  Patient is to return in 4 to 6 weeks for recheck or sooner if needed.    Return in about 4 weeks (around  10/24/2022).    EMR Dragon/Transciption Disclaimer: Some of this note may be an electronic transcription/translation of spoken language to printed text.  The electronic translation of spoken language may permit erroneous, or at times, nonsensical words or phrases to be inadvertently transcribed. Although I have reviewed the note for such errors, some may still exist.       Time spent of a minimum of 30 minutes including the face to face evaluation, reviewing of medical history and prior medial records, reviewing of diagnostic studies, ordering additional tests, documentation, patient education and coordination of care.       This document has been electronically signed by Deena PAYAN on September 26, 2022 10:45 CDT

## 2022-11-03 NOTE — PROGRESS NOTES
Subjective:  Amos Miranda is a 62 y.o. male who presents for       Patient Active Problem List   Diagnosis   • Need for hepatitis C screening test   • Hyperlipidemia   • Essential hypertension   • Low back pain without sciatica   • Right shoulder pain   • Leukocytosis   • Bursitis, shoulder   • Cough   • Neutrophilia   • Renal impairment   • Upper respiratory tract infection   • Right-sided low back pain with right-sided sciatica   • Prediabetes   • Arthritis, lumbar spine   • Vitamin D deficiency   • Overweight   • Stage 2 chronic kidney disease           Current Outpatient Medications:   •  lisinopril (PRINIVIL,ZESTRIL) 40 MG tablet, Take 1 tablet by mouth Daily., Disp: 30 tablet, Rfl: 2  •  RA ASPIRIN ADULT LOW STRENGTH 81 MG chewable tablet, chew and swallow 1 tablet by mouth once daily, Disp: 30 tablet, Rfl: 6    HPI     Pt is 61 yo male with management of HTN, HLP,  Right shoulder pain, low back pain with sciatica, ED,Vitamin D deficiency, CKD        8/25/22 in office visit for recheck. Pt had labwork done on 8/22/22 that showed lipid panel vitamin D and b12 stsable hga1c is at 5.90 from 5.67. CMP showed GFR at 66.9. CBC showed stable hemoglobin and wBC. He saw NEurology and had EMG study recently on left arm/LUE that was normal. He does have tingling in his right arm.  He states his right shoulder has been bothering him.  It hurts when he lifts his right arm    3/22/22 in office visit for recheck. Pt saw ORthopedic on 9/26/22 for his right shoulder pain and had injection on right shoulder. BP is controlled. No chest pain no dizziness. Breathing is stable.   Hei s currently not having numbness in his left arm.  His right shoulder is doing better since receiving his injection       Neurologic Problem  The patient's primary symptoms include an altered mental status, focal sensory loss and focal weakness. The patient's pertinent negatives include no clumsiness, loss of balance, memory loss, slurred  speech, syncope, visual change or weakness. Primary symptoms comment: left arm numbness/tingling. hand tingling . This is a new problem. The problem is unchanged. There was no focality noted. Associated symptoms include shortness of breath. Pertinent negatives include no abdominal pain, auditory change, aura, back pain, bladder incontinence, bowel incontinence, confusion, diaphoresis, dizziness, fatigue, fever, headaches, light-headedness, nausea, neck pain or palpitations. Past treatments include nothing. The treatment provided no relief. There is no history of a bleeding disorder, a clotting disorder, a CVA, dementia, head trauma, liver disease, mood changes or seizures.   Chronic Kidney Disease  This is a chronic problem. The current episode started more than 1 year ago. The problem occurs constantly. The problem has been unchanged. Associated symptoms include arthralgias. Pertinent negatives include no abdominal pain, anorexia, change in bowel habit, chest pain, chills, congestion, coughing, diaphoresis, fatigue, fever, headaches, joint swelling, myalgias, nausea, neck pain, numbness, rash, sore throat, swollen glands, urinary symptoms, vertigo, visual change, vomiting or weakness. Nothing aggravates the symptoms. He has tried nothing for the symptoms. The treatment provided no relief.    Hypertension   This is a chronic problem. The current episode started more than 1 year ago. The problem has beenimproving  since onset. The problem is uncontrolled. Pertinent negatives include no anxiety, blurred vision, chest pain, headaches, malaise/fatigue, neck pain, orthopnea, palpitations, peripheral edema, PND, shortness of breath or sweats. Risk factors for coronary artery disease include dyslipidemia and male gender. Past treatments include ACE inhibitors and diuretics. Current antihypertension treatment includes diuretics and ACE inhibitors. The current treatment provides no improvement. There are no compliance  problems.  There is no history of angina, kidney disease, CAD/MI, CVA, heart failure, left ventricular hypertrophy, PVD or retinopathy. There is no history of chronic renal disease, coarctation of the aorta, hyperaldosteronism, hypercortisolism, a hypertension causing med, pheochromocytoma, renovascular disease or sleep apnea.    Review of Systems  Review of Systems   Constitutional: Positive for activity change and fatigue. Negative for appetite change, chills, diaphoresis and fever.   HENT: Negative for congestion, postnasal drip, rhinorrhea, sinus pressure, sinus pain, sneezing, sore throat, trouble swallowing and voice change.    Respiratory: Negative for cough, choking, chest tightness, shortness of breath, wheezing and stridor.    Cardiovascular: Negative for chest pain.   Gastrointestinal: Negative for diarrhea, nausea and vomiting.   Neurological: Positive for weakness and numbness. Negative for headaches.       Patient Active Problem List   Diagnosis   • Need for hepatitis C screening test   • Hyperlipidemia   • Essential hypertension   • Low back pain without sciatica   • Right shoulder pain   • Leukocytosis   • Bursitis, shoulder   • Cough   • Neutrophilia   • Renal impairment   • Upper respiratory tract infection   • Right-sided low back pain with right-sided sciatica   • Prediabetes   • Arthritis, lumbar spine   • Vitamin D deficiency   • Overweight   • Stage 2 chronic kidney disease     History reviewed. No pertinent surgical history.  Social History     Socioeconomic History   • Marital status:    Tobacco Use   • Smoking status: Never   • Smokeless tobacco: Never   Vaping Use   • Vaping Use: Never used   Substance and Sexual Activity   • Alcohol use: No   • Drug use: Never     Family History   Problem Relation Age of Onset   • Heart disease Mother    • Cancer Father         lung   • Diabetes Other    • Hypertension Other    • Stroke Other    • Lung disease Other      Lab on 08/22/2022    Component Date Value Ref Range Status   • WBC 08/22/2022 9.17  3.40 - 10.80 10*3/mm3 Final   • RBC 08/22/2022 4.78  4.14 - 5.80 10*6/mm3 Final   • Hemoglobin 08/22/2022 14.2  13.0 - 17.7 g/dL Final   • Hematocrit 08/22/2022 42.7  37.5 - 51.0 % Final   • MCV 08/22/2022 89.3  79.0 - 97.0 fL Final   • MCH 08/22/2022 29.7  26.6 - 33.0 pg Final   • MCHC 08/22/2022 33.3  31.5 - 35.7 g/dL Final   • RDW 08/22/2022 13.5  12.3 - 15.4 % Final   • RDW-SD 08/22/2022 44.1  37.0 - 54.0 fl Final   • MPV 08/22/2022 12.2 (H)  6.0 - 12.0 fL Final   • Platelets 08/22/2022 207  140 - 450 10*3/mm3 Final   • Neutrophil % 08/22/2022 72.3  42.7 - 76.0 % Final   • Lymphocyte % 08/22/2022 19.6  19.6 - 45.3 % Final   • Monocyte % 08/22/2022 6.4  5.0 - 12.0 % Final   • Eosinophil % 08/22/2022 1.0  0.3 - 6.2 % Final   • Basophil % 08/22/2022 0.4  0.0 - 1.5 % Final   • Immature Grans % 08/22/2022 0.3  0.0 - 0.5 % Final   • Neutrophils, Absolute 08/22/2022 6.62  1.70 - 7.00 10*3/mm3 Final   • Lymphocytes, Absolute 08/22/2022 1.80  0.70 - 3.10 10*3/mm3 Final   • Monocytes, Absolute 08/22/2022 0.59  0.10 - 0.90 10*3/mm3 Final   • Eosinophils, Absolute 08/22/2022 0.09  0.00 - 0.40 10*3/mm3 Final   • Basophils, Absolute 08/22/2022 0.04  0.00 - 0.20 10*3/mm3 Final   • Immature Grans, Absolute 08/22/2022 0.03  0.00 - 0.05 10*3/mm3 Final   • nRBC 08/22/2022 0.0  0.0 - 0.2 /100 WBC Final   • Glucose 08/22/2022 93  65 - 99 mg/dL Final   • BUN 08/22/2022 11  8 - 23 mg/dL Final   • Creatinine 08/22/2022 1.21  0.76 - 1.27 mg/dL Final   • Sodium 08/22/2022 143  136 - 145 mmol/L Final   • Potassium 08/22/2022 4.9  3.5 - 5.2 mmol/L Final   • Chloride 08/22/2022 107  98 - 107 mmol/L Final   • CO2 08/22/2022 27.0  22.0 - 29.0 mmol/L Final   • Calcium 08/22/2022 9.2  8.6 - 10.5 mg/dL Final   • Total Protein 08/22/2022 6.8  6.0 - 8.5 g/dL Final   • Albumin 08/22/2022 4.10  3.50 - 5.20 g/dL Final   • ALT (SGPT) 08/22/2022 20  1 - 41 U/L Final   • AST (SGOT)  08/22/2022 18  1 - 40 U/L Final   • Alkaline Phosphatase 08/22/2022 50  39 - 117 U/L Final   • Total Bilirubin 08/22/2022 0.8  0.0 - 1.2 mg/dL Final   • Globulin 08/22/2022 2.7  gm/dL Final   • A/G Ratio 08/22/2022 1.5  g/dL Final   • BUN/Creatinine Ratio 08/22/2022 9.1  7.0 - 25.0 Final   • Anion Gap 08/22/2022 9.0  5.0 - 15.0 mmol/L Final   • eGFR 08/22/2022 66.9  >60.0 mL/min/1.73 Final    National Kidney Foundation and American Society of Nephrology (ASN) Task Force recommended calculation based on the Chronic Kidney Disease Epidemiology Collaboration (CKD-EPI) equation refit without adjustment for race.   • Hemoglobin A1C 08/22/2022 5.90 (H)  4.80 - 5.60 % Final   • Total Cholesterol 08/22/2022 120  0 - 200 mg/dL Final   • Triglycerides 08/22/2022 71  0 - 150 mg/dL Final   • HDL Cholesterol 08/22/2022 43  40 - 60 mg/dL Final   • LDL Cholesterol  08/22/2022 62  0 - 100 mg/dL Final   • VLDL Cholesterol 08/22/2022 15  5 - 40 mg/dL Final   • LDL/HDL Ratio 08/22/2022 1.46   Final   • 25 Hydroxy, Vitamin D 08/22/2022 31.3  30.0 - 100.0 ng/ml Final   • Vitamin B-12 08/22/2022 495  211 - 946 pg/mL Final      XR Shoulder 2+ View Right  Narrative: Three view right shoulder    HISTORY: Right shoulder pain    AP films with the humerus in internal and external rotation and  scapular Y view were obtained.    COMPARISON: March 6, 2017    FINDINGS:   No fracture or dislocation.  Minimal hypertrophic change acromioclavicular joint.  6 mm spur coracoid process.  No other osseous or articular abnormality.  Impression: CONCLUSION:  Minimal hypertrophic change acromioclavicular joint.  6 mm spur coracoid process.    [id number]    Electronically signed by:  Joshua Willett MD  8/29/2022 6:38 PM CDT  Workstation: [id number]    [email or web address]  [facility] [place] History   Administered Date(s) Administered   • COVID-19 (MODERNA) 1st, 2nd, 3rd Dose Only 05/13/2021, 06/10/2021   • FluLaval/Fluzone >6mos 11/01/2019   • Tetanus 01/01/2012   • influenza  "Split 01/01/2015       The following portions of the patient's history were reviewed and updated as appropriate: allergies, current medications, past family history, past medical history, past social history, past surgical history and problem list.        Physical Exam  /68 (BP Location: Right arm, Patient Position: Sitting, Cuff Size: Adult)   Pulse 85   Temp 97.5 °F (36.4 °C)   Ht 167.6 cm (66\")   Wt 82.6 kg (182 lb 3.2 oz)   SpO2 97%   BMI 29.41 kg/m²     Physical Exam  Vitals and nursing note reviewed.   Constitutional:       Appearance: He is well-developed. He is not diaphoretic.   HENT:      Head: Normocephalic and atraumatic.      Right Ear: External ear normal.   Eyes:      Conjunctiva/sclera: Conjunctivae normal.      Pupils: Pupils are equal, round, and reactive to light.   Cardiovascular:      Rate and Rhythm: Normal rate and regular rhythm.      Heart sounds: Normal heart sounds. No murmur heard.  Pulmonary:      Effort: Pulmonary effort is normal. No respiratory distress.      Breath sounds: Normal breath sounds.   Abdominal:      General: Bowel sounds are normal. There is no distension.      Palpations: Abdomen is soft.      Tenderness: There is no abdominal tenderness.   Musculoskeletal:         General: Tenderness present. No deformity. Normal range of motion.      Cervical back: Normal range of motion and neck supple.   Skin:     General: Skin is warm.      Coloration: Skin is not pale.      Findings: No erythema or rash.   Neurological:      Mental Status: He is alert and oriented to person, place, and time.      Cranial Nerves: No cranial nerve deficit.   Psychiatric:         Behavior: Behavior normal.         [unfilled]   Diagnosis Plan   1. Essential hypertension  CBC Auto Differential    Comprehensive Metabolic Panel    Hemoglobin A1c    Lipid Panel    Vitamin D,25-Hydroxy      2. Stage 2 chronic kidney disease  CBC Auto Differential    Comprehensive Metabolic Panel    " Hemoglobin A1c    Lipid Panel    Vitamin D,25-Hydroxy      3. Vitamin D deficiency  CBC Auto Differential    Comprehensive Metabolic Panel    Hemoglobin A1c    Lipid Panel    Vitamin D,25-Hydroxy      4. Prediabetes  CBC Auto Differential    Comprehensive Metabolic Panel    Hemoglobin A1c    Lipid Panel    Vitamin D,25-Hydroxy      5. Mixed hyperlipidemia  CBC Auto Differential    Comprehensive Metabolic Panel    Hemoglobin A1c    Lipid Panel    Vitamin D,25-Hydroxy              -recommend labwork   -recommend shingles vaccination   -will get last colonscopy screening from Dr. Quinn   -recommend influenza vaccination - already had   -right shoulder pain -Orthopedic following had recent injection   -numbness/tingling in left arm - refer to Neurology for EMG study. Advised pt to see Neurology to get right arm tested.    -CKD stage 2 -gave information today.      -ED - on cialis   -overweight - counseled weight loss >5 minutes BMI at 29.41   -vitamin D deficiency - check vitamin D levels   -prediabetes prediabetes meal plan    -HTN -stable lisionpril 40 mgdaily.  stop chlorathalidone   -HLP -  Off lipitor 40 mg PO qhs. Recommend heart healthy diet   -advised pt to go to ER or call 911 if symptoms worrisome or severe  -advised pt to followup with specialist and referrals   -advised pt to be safe and call with questions and concerns   -advised pt to be safe during COVID-19 pandemic   I spent 30  minutes caring for Amos on this date of service. This time includes time spent by me in the following activities: preparing for the visit, reviewing tests, obtaining and/or reviewing a separately obtained history, performing a medically appropriate examination and/or evaluation, counseling and educating the patient/family/caregiver, ordering medications, tests, or procedures, referring and communicating with other health care professionals, documenting information in the medical record, independently interpreting results and  communicating that information with the patient/family/caregiver and care coordination.   -recheck in 3 months         This document has been electronically signed by Manuel Andersen MD on November 22, 2022 15:31 CST

## 2022-11-22 ENCOUNTER — OFFICE VISIT (OUTPATIENT)
Dept: FAMILY MEDICINE CLINIC | Facility: CLINIC | Age: 62
End: 2022-11-22

## 2022-11-22 VITALS
TEMPERATURE: 97.5 F | BODY MASS INDEX: 29.28 KG/M2 | OXYGEN SATURATION: 97 % | HEART RATE: 85 BPM | DIASTOLIC BLOOD PRESSURE: 68 MMHG | WEIGHT: 182.2 LBS | HEIGHT: 66 IN | SYSTOLIC BLOOD PRESSURE: 130 MMHG

## 2022-11-22 DIAGNOSIS — N18.2 STAGE 2 CHRONIC KIDNEY DISEASE: ICD-10-CM

## 2022-11-22 DIAGNOSIS — E55.9 VITAMIN D DEFICIENCY: ICD-10-CM

## 2022-11-22 DIAGNOSIS — R73.03 PREDIABETES: ICD-10-CM

## 2022-11-22 DIAGNOSIS — I10 ESSENTIAL HYPERTENSION: Primary | ICD-10-CM

## 2022-11-22 DIAGNOSIS — E78.2 MIXED HYPERLIPIDEMIA: ICD-10-CM

## 2022-11-22 PROCEDURE — 99214 OFFICE O/P EST MOD 30 MIN: CPT | Performed by: FAMILY MEDICINE

## 2022-11-22 RX ORDER — LISINOPRIL 40 MG/1
40 TABLET ORAL DAILY
Qty: 30 TABLET | Refills: 2 | Status: SHIPPED | OUTPATIENT
Start: 2022-11-22 | End: 2023-02-22

## 2023-02-14 ENCOUNTER — LAB (OUTPATIENT)
Dept: LAB | Facility: HOSPITAL | Age: 63
End: 2023-02-14
Payer: COMMERCIAL

## 2023-02-14 DIAGNOSIS — E78.2 MIXED HYPERLIPIDEMIA: ICD-10-CM

## 2023-02-14 DIAGNOSIS — N18.2 STAGE 2 CHRONIC KIDNEY DISEASE: ICD-10-CM

## 2023-02-14 DIAGNOSIS — E55.9 VITAMIN D DEFICIENCY: ICD-10-CM

## 2023-02-14 DIAGNOSIS — I10 ESSENTIAL HYPERTENSION: ICD-10-CM

## 2023-02-14 DIAGNOSIS — R73.03 PREDIABETES: ICD-10-CM

## 2023-02-14 LAB
25(OH)D3 SERPL-MCNC: 31.3 NG/ML (ref 30–100)
ALBUMIN SERPL-MCNC: 4 G/DL (ref 3.5–5.2)
ALBUMIN/GLOB SERPL: 1.3 G/DL
ALP SERPL-CCNC: 51 U/L (ref 39–117)
ALT SERPL W P-5'-P-CCNC: 20 U/L (ref 1–41)
ANION GAP SERPL CALCULATED.3IONS-SCNC: 11 MMOL/L (ref 5–15)
AST SERPL-CCNC: 20 U/L (ref 1–40)
BASOPHILS # BLD AUTO: 0.04 10*3/MM3 (ref 0–0.2)
BASOPHILS NFR BLD AUTO: 0.5 % (ref 0–1.5)
BILIRUB SERPL-MCNC: 0.5 MG/DL (ref 0–1.2)
BUN SERPL-MCNC: 10 MG/DL (ref 8–23)
BUN/CREAT SERPL: 8.8 (ref 7–25)
CALCIUM SPEC-SCNC: 9 MG/DL (ref 8.6–10.5)
CHLORIDE SERPL-SCNC: 106 MMOL/L (ref 98–107)
CHOLEST SERPL-MCNC: 130 MG/DL (ref 0–200)
CO2 SERPL-SCNC: 26 MMOL/L (ref 22–29)
CREAT SERPL-MCNC: 1.13 MG/DL (ref 0.76–1.27)
DEPRECATED RDW RBC AUTO: 41.7 FL (ref 37–54)
EGFRCR SERPLBLD CKD-EPI 2021: 72.1 ML/MIN/1.73
EOSINOPHIL # BLD AUTO: 0.05 10*3/MM3 (ref 0–0.4)
EOSINOPHIL NFR BLD AUTO: 0.6 % (ref 0.3–6.2)
ERYTHROCYTE [DISTWIDTH] IN BLOOD BY AUTOMATED COUNT: 13.3 % (ref 12.3–15.4)
GLOBULIN UR ELPH-MCNC: 3.1 GM/DL
GLUCOSE SERPL-MCNC: 92 MG/DL (ref 65–99)
HBA1C MFR BLD: 5.5 % (ref 4.8–5.6)
HCT VFR BLD AUTO: 41 % (ref 37.5–51)
HDLC SERPL-MCNC: 46 MG/DL (ref 40–60)
HGB BLD-MCNC: 14.2 G/DL (ref 13–17.7)
IMM GRANULOCYTES # BLD AUTO: 0.03 10*3/MM3 (ref 0–0.05)
IMM GRANULOCYTES NFR BLD AUTO: 0.4 % (ref 0–0.5)
LDLC SERPL CALC-MCNC: 73 MG/DL (ref 0–100)
LDLC/HDLC SERPL: 1.61 {RATIO}
LYMPHOCYTES # BLD AUTO: 1.53 10*3/MM3 (ref 0.7–3.1)
LYMPHOCYTES NFR BLD AUTO: 19.4 % (ref 19.6–45.3)
MCH RBC QN AUTO: 30 PG (ref 26.6–33)
MCHC RBC AUTO-ENTMCNC: 34.6 G/DL (ref 31.5–35.7)
MCV RBC AUTO: 86.7 FL (ref 79–97)
MONOCYTES # BLD AUTO: 0.45 10*3/MM3 (ref 0.1–0.9)
MONOCYTES NFR BLD AUTO: 5.7 % (ref 5–12)
NEUTROPHILS NFR BLD AUTO: 5.77 10*3/MM3 (ref 1.7–7)
NEUTROPHILS NFR BLD AUTO: 73.4 % (ref 42.7–76)
NRBC BLD AUTO-RTO: 0 /100 WBC (ref 0–0.2)
PLATELET # BLD AUTO: 195 10*3/MM3 (ref 140–450)
PMV BLD AUTO: 12.1 FL (ref 6–12)
POTASSIUM SERPL-SCNC: 4.2 MMOL/L (ref 3.5–5.2)
PROT SERPL-MCNC: 7.1 G/DL (ref 6–8.5)
RBC # BLD AUTO: 4.73 10*6/MM3 (ref 4.14–5.8)
SODIUM SERPL-SCNC: 143 MMOL/L (ref 136–145)
TRIGL SERPL-MCNC: 50 MG/DL (ref 0–150)
VLDLC SERPL-MCNC: 11 MG/DL (ref 5–40)
WBC NRBC COR # BLD: 7.87 10*3/MM3 (ref 3.4–10.8)

## 2023-02-14 PROCEDURE — 80053 COMPREHEN METABOLIC PANEL: CPT

## 2023-02-14 PROCEDURE — 85025 COMPLETE CBC W/AUTO DIFF WBC: CPT

## 2023-02-14 PROCEDURE — 83036 HEMOGLOBIN GLYCOSYLATED A1C: CPT

## 2023-02-14 PROCEDURE — 82306 VITAMIN D 25 HYDROXY: CPT

## 2023-02-14 PROCEDURE — 80061 LIPID PANEL: CPT

## 2023-02-19 PROBLEM — Z87.898 HISTORY OF PREDIABETES: Status: ACTIVE | Noted: 2023-02-19

## 2023-02-22 ENCOUNTER — OFFICE VISIT (OUTPATIENT)
Dept: FAMILY MEDICINE CLINIC | Facility: CLINIC | Age: 63
End: 2023-02-22
Payer: COMMERCIAL

## 2023-02-22 VITALS
SYSTOLIC BLOOD PRESSURE: 160 MMHG | WEIGHT: 181.2 LBS | HEIGHT: 66 IN | TEMPERATURE: 97.7 F | HEART RATE: 83 BPM | DIASTOLIC BLOOD PRESSURE: 80 MMHG | BODY MASS INDEX: 29.12 KG/M2 | OXYGEN SATURATION: 100 %

## 2023-02-22 DIAGNOSIS — Z82.49 FAMILY HISTORY OF HEART DISEASE: Primary | ICD-10-CM

## 2023-02-22 DIAGNOSIS — N18.2 STAGE 2 CHRONIC KIDNEY DISEASE: ICD-10-CM

## 2023-02-22 DIAGNOSIS — E55.9 VITAMIN D DEFICIENCY: ICD-10-CM

## 2023-02-22 DIAGNOSIS — I10 ESSENTIAL HYPERTENSION: ICD-10-CM

## 2023-02-22 DIAGNOSIS — E66.3 OVERWEIGHT: ICD-10-CM

## 2023-02-22 DIAGNOSIS — Z87.898 HISTORY OF PREDIABETES: ICD-10-CM

## 2023-02-22 DIAGNOSIS — I51.7 RIGHT ATRIAL ENLARGEMENT: ICD-10-CM

## 2023-02-22 DIAGNOSIS — E78.2 MIXED HYPERLIPIDEMIA: ICD-10-CM

## 2023-02-22 PROCEDURE — 93010 ELECTROCARDIOGRAM REPORT: CPT | Performed by: INTERNAL MEDICINE

## 2023-02-22 PROCEDURE — 99214 OFFICE O/P EST MOD 30 MIN: CPT | Performed by: FAMILY MEDICINE

## 2023-02-22 PROCEDURE — 93005 ELECTROCARDIOGRAM TRACING: CPT | Performed by: FAMILY MEDICINE

## 2023-02-22 RX ORDER — LISINOPRIL 40 MG/1
40 TABLET ORAL DAILY
Qty: 30 TABLET | Refills: 2 | Status: CANCELLED | OUTPATIENT
Start: 2023-02-22

## 2023-02-22 RX ORDER — LOSARTAN POTASSIUM AND HYDROCHLOROTHIAZIDE 12.5; 5 MG/1; MG/1
1 TABLET ORAL DAILY
Qty: 30 TABLET | Refills: 3 | Status: SHIPPED | OUTPATIENT
Start: 2023-02-22

## 2023-02-22 NOTE — PATIENT INSTRUCTIONS
Stop lisionpril 40 mg dialy    Start on losartan-HCTZ 50-12.5 mg daily.    Monitor blood pressure at home and bring readings next visit    Will get echocardiogram of heart     Recheck in 6 weeks

## 2023-03-02 LAB
QT INTERVAL: 382 MS
QTC INTERVAL: 412 MS

## 2023-03-16 RX ORDER — LISINOPRIL 40 MG/1
40 TABLET ORAL DAILY
Qty: 30 TABLET | Refills: 2 | OUTPATIENT
Start: 2023-03-16

## 2023-03-16 NOTE — TELEPHONE ENCOUNTER
Rx Refill Note  Requested Prescriptions     Refused Prescriptions Disp Refills   • lisinopril (PRINIVIL,ZESTRIL) 40 MG tablet [Pharmacy Med Name: LISINOPRIL 40MG TABLETS] 30 tablet 2     Sig: Take 1 tablet by mouth Daily.      Last office visit with prescribing clinician: 2/22/2023   Last telemedicine visit with prescribing clinician: 4/5/2023   Next office visit with prescribing clinician: 4/5/2023   {TIP  Encounters:    discontinued on 2/22/2023 by Manuel Andersen MD.                       Would you like a call back once the refill request has been completed: [] Yes [] No    If the office needs to give you a call back, can they leave a voicemail: [] Yes [] No    Elier Henderson LPN  03/16/23, 09:40 CDT

## 2023-03-30 PROBLEM — Z82.49 FAMILY HISTORY OF HEART DISEASE: Status: ACTIVE | Noted: 2023-03-30

## 2023-05-22 ENCOUNTER — TELEPHONE (OUTPATIENT)
Dept: FAMILY MEDICINE CLINIC | Facility: CLINIC | Age: 63
End: 2023-05-22
Payer: COMMERCIAL

## 2023-05-22 NOTE — TELEPHONE ENCOUNTER
Elvira ([phone]) with [facility] called stating needs a PA on an Echo that is scheduled for May 24, 2023; would like a return call

## 2023-08-29 ENCOUNTER — LAB (OUTPATIENT)
Dept: LAB | Facility: HOSPITAL | Age: 63
End: 2023-08-29
Payer: COMMERCIAL

## 2023-08-29 DIAGNOSIS — N18.2 STAGE 2 CHRONIC KIDNEY DISEASE: ICD-10-CM

## 2023-08-29 DIAGNOSIS — I10 ESSENTIAL HYPERTENSION: ICD-10-CM

## 2023-08-29 DIAGNOSIS — Z87.898 HISTORY OF PREDIABETES: ICD-10-CM

## 2023-08-29 DIAGNOSIS — E78.2 MIXED HYPERLIPIDEMIA: ICD-10-CM

## 2023-08-29 DIAGNOSIS — E66.3 OVERWEIGHT: ICD-10-CM

## 2023-08-29 DIAGNOSIS — E55.9 VITAMIN D DEFICIENCY: ICD-10-CM

## 2023-08-29 LAB
ALBUMIN SERPL-MCNC: 3.9 G/DL (ref 3.5–5.2)
ALBUMIN/GLOB SERPL: 1.1 G/DL
ALP SERPL-CCNC: 46 U/L (ref 39–117)
ALT SERPL W P-5'-P-CCNC: 22 U/L (ref 1–41)
ANION GAP SERPL CALCULATED.3IONS-SCNC: 10.6 MMOL/L (ref 5–15)
AST SERPL-CCNC: 24 U/L (ref 1–40)
BASOPHILS # BLD AUTO: 0.04 10*3/MM3 (ref 0–0.2)
BASOPHILS NFR BLD AUTO: 0.5 % (ref 0–1.5)
BILIRUB SERPL-MCNC: 0.7 MG/DL (ref 0–1.2)
BUN SERPL-MCNC: 12 MG/DL (ref 8–23)
BUN/CREAT SERPL: 9.7 (ref 7–25)
CALCIUM SPEC-SCNC: 9 MG/DL (ref 8.6–10.5)
CHLORIDE SERPL-SCNC: 106 MMOL/L (ref 98–107)
CHOLEST SERPL-MCNC: 126 MG/DL (ref 0–200)
CO2 SERPL-SCNC: 24.4 MMOL/L (ref 22–29)
CREAT SERPL-MCNC: 1.24 MG/DL (ref 0.76–1.27)
DEPRECATED RDW RBC AUTO: 42.9 FL (ref 37–54)
EGFRCR SERPLBLD CKD-EPI 2021: 64.5 ML/MIN/1.73
EOSINOPHIL # BLD AUTO: 0.08 10*3/MM3 (ref 0–0.4)
EOSINOPHIL NFR BLD AUTO: 0.9 % (ref 0.3–6.2)
ERYTHROCYTE [DISTWIDTH] IN BLOOD BY AUTOMATED COUNT: 13.4 % (ref 12.3–15.4)
GLOBULIN UR ELPH-MCNC: 3.4 GM/DL
GLUCOSE SERPL-MCNC: 91 MG/DL (ref 65–99)
HBA1C MFR BLD: 5.7 % (ref 4.8–5.6)
HCT VFR BLD AUTO: 40.3 % (ref 37.5–51)
HDLC SERPL-MCNC: 40 MG/DL (ref 40–60)
HGB BLD-MCNC: 14.1 G/DL (ref 13–17.7)
IMM GRANULOCYTES # BLD AUTO: 0.03 10*3/MM3 (ref 0–0.05)
IMM GRANULOCYTES NFR BLD AUTO: 0.3 % (ref 0–0.5)
LDLC SERPL CALC-MCNC: 73 MG/DL (ref 0–100)
LDLC/HDLC SERPL: 1.84 {RATIO}
LYMPHOCYTES # BLD AUTO: 1.74 10*3/MM3 (ref 0.7–3.1)
LYMPHOCYTES NFR BLD AUTO: 20.2 % (ref 19.6–45.3)
MCH RBC QN AUTO: 30.9 PG (ref 26.6–33)
MCHC RBC AUTO-ENTMCNC: 35 G/DL (ref 31.5–35.7)
MCV RBC AUTO: 88.2 FL (ref 79–97)
MONOCYTES # BLD AUTO: 0.6 10*3/MM3 (ref 0.1–0.9)
MONOCYTES NFR BLD AUTO: 7 % (ref 5–12)
NEUTROPHILS NFR BLD AUTO: 6.13 10*3/MM3 (ref 1.7–7)
NEUTROPHILS NFR BLD AUTO: 71.1 % (ref 42.7–76)
NRBC BLD AUTO-RTO: 0 /100 WBC (ref 0–0.2)
PLATELET # BLD AUTO: 209 10*3/MM3 (ref 140–450)
PMV BLD AUTO: 11.8 FL (ref 6–12)
POTASSIUM SERPL-SCNC: 3.9 MMOL/L (ref 3.5–5.2)
PROT SERPL-MCNC: 7.3 G/DL (ref 6–8.5)
RBC # BLD AUTO: 4.57 10*6/MM3 (ref 4.14–5.8)
SODIUM SERPL-SCNC: 141 MMOL/L (ref 136–145)
TRIGL SERPL-MCNC: 62 MG/DL (ref 0–150)
VLDLC SERPL-MCNC: 13 MG/DL (ref 5–40)
WBC NRBC COR # BLD: 8.62 10*3/MM3 (ref 3.4–10.8)

## 2023-08-29 PROCEDURE — 80061 LIPID PANEL: CPT

## 2023-08-29 PROCEDURE — 80053 COMPREHEN METABOLIC PANEL: CPT

## 2023-08-29 PROCEDURE — 85025 COMPLETE CBC W/AUTO DIFF WBC: CPT

## 2023-08-29 PROCEDURE — 83036 HEMOGLOBIN GLYCOSYLATED A1C: CPT

## 2023-08-31 NOTE — PROGRESS NOTES
Subjective:  Amos Miranda is a 63 y.o. male who presents for       Patient Active Problem List   Diagnosis    Need for hepatitis C screening test    Hyperlipidemia    Essential hypertension    Low back pain without sciatica    Right shoulder pain    Leukocytosis    Bursitis, shoulder    Cough    Neutrophilia    Renal impairment    Upper respiratory tract infection    Right-sided low back pain with right-sided sciatica    Prediabetes    Arthritis, lumbar spine    Vitamin D deficiency    Overweight    Stage 2 chronic kidney disease    History of prediabetes    Family history of heart disease    Nonrheumatic mitral valve regurgitation    Nonrheumatic tricuspid valve regurgitation           Current Outpatient Medications:     losartan-hydrochlorothiazide (Hyzaar) 50-12.5 MG per tablet, Take 1 tablet by mouth Daily., Disp: 30 tablet, Rfl: 3    RA ASPIRIN ADULT LOW STRENGTH 81 MG chewable tablet, chew and swallow 1 tablet by mouth once daily, Disp: 30 tablet, Rfl: 6    HPI     Pt is 62 yo male with management of HTN, HLP,  Right shoulder pain, low back pain with sciatica, ED,Vitamin D deficiency, CKD stage 2, prediabetes, echo on 5/24/23 (trace mitral/tricuspid valve regurgitation, grade 1 impaired relaxation)     3/22/22 in office visit for recheck. Pt saw ORthopedic on 9/26/22 for his right shoulder pain and had injection on right shoulder. BP is controlled. No chest pain no dizziness. Breathing is stable.   Hei s currently not having numbness in his left arm.  His right shoulder is doing better since receiving his injection    2/22/23 in office visit for recheck . Pt had labwork done on 2/14/23 that showed vitamin D lipid panel hga1c normal CMP showed GFR at 72.1 normal liver enzymes. CBC showed stable hemoglobin and WBC. BP is high today and pt is taking lisinopril 40 mg daily.   His BP is high today. He checkd his BP a few weeks ago and was having a headache. His BP was elevated then  >140/90..  Breathing stable pt is concerned about his heart.  Her mother passed away due to MI. Her sister had an MI and brother was recently diagnosed with heart problem and has AICD. Pt reports no chest pain no dizziness. No edema of legs no palpitations     9/7/23 in office visit for recheck. Pt had labwork on 8/29/23 that showed hga1c at 5.7 and rest of labwork stable with GFR in 60s. Pt had echocardiogram on 5/24/23 that showed EF of 61-65% with grade 1 impaired relaxation. There is mild to trace tricuspid and mitral valve regurgitation.  Pt reports no chest pain no shortness of breath no dizziness..pt reports having diarrhea/loose stools  as soon as he drinks sugars drinks like sodas and juice.    Neurologic Problem  The patient's primary symptoms include an altered mental status, focal sensory loss and focal weakness. The patient's pertinent negatives include no clumsiness, loss of balance, memory loss, slurred speech, syncope, visual change or weakness. Primary symptoms comment: left arm numbness/tingling. hand tingling . This is a new problem. The problem is unchanged. There was no focality noted. Associated symptoms include shortness of breath. Pertinent negatives include no abdominal pain, auditory change, aura, back pain, bladder incontinence, bowel incontinence, confusion, diaphoresis, dizziness, fatigue, fever, headaches, light-headedness, nausea, neck pain or palpitations. Past treatments include nothing. The treatment provided no relief. There is no history of a bleeding disorder, a clotting disorder, a CVA, dementia, head trauma, liver disease, mood changes or seizures.   Chronic Kidney Disease  This is a chronic problem. The current episode started more than 1 year ago. The problem occurs constantly. The problem has been unchanged. Associated symptoms include arthralgias. Pertinent negatives include no abdominal pain, anorexia, change in bowel habit, chest pain, chills, congestion, coughing, diaphoresis,  fatigue, fever, headaches, joint swelling, myalgias, nausea, neck pain, numbness, rash, sore throat, swollen glands, urinary symptoms, vertigo, visual change, vomiting or weakness. Nothing aggravates the symptoms. He has tried nothing for the symptoms. The treatment provided no relief.    Hypertension   This is a chronic problem. The current episode started more than 1 year ago. The problem has beenimproving  since onset. The problem is uncontrolled. Pertinent negatives include no anxiety, blurred vision, chest pain, headaches, malaise/fatigue, neck pain, orthopnea, palpitations, peripheral edema, PND, shortness of breath or sweats. Risk factors for coronary artery disease include dyslipidemia and male gender. Past treatments include ACE inhibitors and diuretics. Current antihypertension treatment includes diuretics and ACE inhibitors. The current treatment provides no improvement. There are no compliance problems.  There is no history of angina, kidney disease, CAD/MI, CVA, heart failure, left ventricular hypertrophy, PVD or retinopathy. There is no history of chronic renal disease, coarctation of the aorta, hyperaldosteronism, hypercortisolism, a hypertension causing med, pheochromocytoma, renovascular disease or sleep apnea.    Review of Systems  Review of Systems   Constitutional:  Positive for activity change and fatigue. Negative for appetite change, chills, diaphoresis and fever.   HENT:  Negative for congestion, postnasal drip, rhinorrhea, sinus pressure, sinus pain, sneezing, sore throat, trouble swallowing and voice change.    Respiratory:  Negative for cough, choking, chest tightness, shortness of breath, wheezing and stridor.    Cardiovascular:  Negative for chest pain.   Gastrointestinal:  Positive for diarrhea. Negative for nausea and vomiting.   Neurological:  Positive for weakness and numbness. Negative for headaches.     Patient Active Problem List   Diagnosis    Need for hepatitis C screening test     Hyperlipidemia    Essential hypertension    Low back pain without sciatica    Right shoulder pain    Leukocytosis    Bursitis, shoulder    Cough    Neutrophilia    Renal impairment    Upper respiratory tract infection    Right-sided low back pain with right-sided sciatica    Prediabetes    Arthritis, lumbar spine    Vitamin D deficiency    Overweight    Stage 2 chronic kidney disease    History of prediabetes    Family history of heart disease    Nonrheumatic mitral valve regurgitation    Nonrheumatic tricuspid valve regurgitation     History reviewed. No pertinent surgical history.  Social History     Socioeconomic History    Marital status:    Tobacco Use    Smoking status: Never    Smokeless tobacco: Never   Vaping Use    Vaping Use: Never used   Substance and Sexual Activity    Alcohol use: No    Drug use: Never    Sexual activity: Defer     Family History   Problem Relation Age of Onset    Heart disease Mother     Cancer Father         lung    Diabetes Other     Hypertension Other     Stroke Other     Lung disease Other      Lab on 08/29/2023   Component Date Value Ref Range Status    WBC 08/29/2023 8.62  3.40 - 10.80 10*3/mm3 Final    RBC 08/29/2023 4.57  4.14 - 5.80 10*6/mm3 Final    Hemoglobin 08/29/2023 14.1  13.0 - 17.7 g/dL Final    Hematocrit 08/29/2023 40.3  37.5 - 51.0 % Final    MCV 08/29/2023 88.2  79.0 - 97.0 fL Final    MCH 08/29/2023 30.9  26.6 - 33.0 pg Final    MCHC 08/29/2023 35.0  31.5 - 35.7 g/dL Final    RDW 08/29/2023 13.4  12.3 - 15.4 % Final    RDW-SD 08/29/2023 42.9  37.0 - 54.0 fl Final    MPV 08/29/2023 11.8  6.0 - 12.0 fL Final    Platelets 08/29/2023 209  140 - 450 10*3/mm3 Final    Neutrophil % 08/29/2023 71.1  42.7 - 76.0 % Final    Lymphocyte % 08/29/2023 20.2  19.6 - 45.3 % Final    Monocyte % 08/29/2023 7.0  5.0 - 12.0 % Final    Eosinophil % 08/29/2023 0.9  0.3 - 6.2 % Final    Basophil % 08/29/2023 0.5  0.0 - 1.5 % Final    Immature Grans % 08/29/2023 0.3  0.0 - 0.5  % Final    Neutrophils, Absolute 08/29/2023 6.13  1.70 - 7.00 10*3/mm3 Final    Lymphocytes, Absolute 08/29/2023 1.74  0.70 - 3.10 10*3/mm3 Final    Monocytes, Absolute 08/29/2023 0.60  0.10 - 0.90 10*3/mm3 Final    Eosinophils, Absolute 08/29/2023 0.08  0.00 - 0.40 10*3/mm3 Final    Basophils, Absolute 08/29/2023 0.04  0.00 - 0.20 10*3/mm3 Final    Immature Grans, Absolute 08/29/2023 0.03  0.00 - 0.05 10*3/mm3 Final    nRBC 08/29/2023 0.0  0.0 - 0.2 /100 WBC Final    Glucose 08/29/2023 91  65 - 99 mg/dL Final    BUN 08/29/2023 12  8 - 23 mg/dL Final    Creatinine 08/29/2023 1.24  0.76 - 1.27 mg/dL Final    Sodium 08/29/2023 141  136 - 145 mmol/L Final    Potassium 08/29/2023 3.9  3.5 - 5.2 mmol/L Final    Chloride 08/29/2023 106  98 - 107 mmol/L Final    CO2 08/29/2023 24.4  22.0 - 29.0 mmol/L Final    Calcium 08/29/2023 9.0  8.6 - 10.5 mg/dL Final    Total Protein 08/29/2023 7.3  6.0 - 8.5 g/dL Final    Albumin 08/29/2023 3.9  3.5 - 5.2 g/dL Final    ALT (SGPT) 08/29/2023 22  1 - 41 U/L Final    AST (SGOT) 08/29/2023 24  1 - 40 U/L Final    Alkaline Phosphatase 08/29/2023 46  39 - 117 U/L Final    Total Bilirubin 08/29/2023 0.7  0.0 - 1.2 mg/dL Final    Globulin 08/29/2023 3.4  gm/dL Final    A/G Ratio 08/29/2023 1.1  g/dL Final    BUN/Creatinine Ratio 08/29/2023 9.7  7.0 - 25.0 Final    Anion Gap 08/29/2023 10.6  5.0 - 15.0 mmol/L Final    eGFR 08/29/2023 64.5  >60.0 mL/min/1.73 Final    Hemoglobin A1C 08/29/2023 5.70 (H)  4.80 - 5.60 % Final    Total Cholesterol 08/29/2023 126  0 - 200 mg/dL Final    Triglycerides 08/29/2023 62  0 - 150 mg/dL Final    HDL Cholesterol 08/29/2023 40  40 - 60 mg/dL Final    LDL Cholesterol  08/29/2023 73  0 - 100 mg/dL Final    VLDL Cholesterol 08/29/2023 13  5 - 40 mg/dL Final    LDL/HDL Ratio 08/29/2023 1.84   Final   Ancillary Procedure on 05/24/2023   Component Date Value Ref Range Status    Target HR (85%) 05/24/2023 132  bpm Final    Max. Pred. HR (100%) 05/24/2023 155  bpm  Final    EF(MOD-bp) 05/24/2023 62.8  % Final    LVIDd 05/24/2023 3.7  cm Final    LVIDs 05/24/2023 2.33  cm Final    IVSd 05/24/2023 1.03  cm Final    LVPWd 05/24/2023 1.00  cm Final    FS 05/24/2023 36.1  % Final    IVS/LVPW 05/24/2023 1.03  cm Final    ESV(cubed) 05/24/2023 12.7  ml Final    EDV(cubed) 05/24/2023 48.7  ml Final    LVOT area 05/24/2023 3.1  cm2 Final    LV mass(C)d 05/24/2023 112.9  grams Final    LVOT diam 05/24/2023 1.97  cm Final    EDV(MOD-sp2) 05/24/2023 60.2  ml Final    EDV(MOD-sp4) 05/24/2023 90.5  ml Final    ESV(MOD-sp2) 05/24/2023 25.4  ml Final    ESV(MOD-sp4) 05/24/2023 32.8  ml Final    SV(MOD-sp2) 05/24/2023 34.8  ml Final    SV(MOD-sp4) 05/24/2023 57.7  ml Final    EF(MOD-sp2) 05/24/2023 57.8  % Final    EF(MOD-sp4) 05/24/2023 63.8  % Final    MV E max jason 05/24/2023 92.5  cm/sec Final    MV A max jason 05/24/2023 89.5  cm/sec Final    MV dec time 05/24/2023 0.18  msec Final    MV E/A 05/24/2023 1.03   Final    Med Peak E' Jason 05/24/2023 10.2  cm/sec Final    Lat Peak E' Jason 05/24/2023 9.7  cm/sec Final    Avg E/e' ratio 05/24/2023 9.30   Final    SV(LVOT) 05/24/2023 68.3  ml Final    RV Base 05/24/2023 3.8  cm Final    RV Mid 05/24/2023 2.24  cm Final    TAPSE (>1.6) 05/24/2023 2.38  cm Final    LA dimension (2D)  05/24/2023 3.0  cm Final    LV V1 max 05/24/2023 116.5  cm/sec Final    LV V1 max PG 05/24/2023 5.4  mmHg Final    LV V1 mean PG 05/24/2023 2.5  mmHg Final    LV V1 VTI 05/24/2023 22.3  cm Final    Ao pk jason 05/24/2023 133.6  cm/sec Final    Ao max PG 05/24/2023 7.1  mmHg Final    Ao mean PG 05/24/2023 3.4  mmHg Final    Ao V2 VTI 05/24/2023 27.0  cm Final    ELKIN(I,D) 05/24/2023 2.5  cm2 Final    MV max PG 05/24/2023 4.3  mmHg Final    MV mean PG 05/24/2023 1.40  mmHg Final    MV V2 VTI 05/24/2023 32.8  cm Final    MVA(VTI) 05/24/2023 2.08  cm2 Final    MV dec slope 05/24/2023 500.6  cm/sec2 Final    RV V1 max PG 05/24/2023 1.98  mmHg Final    RV V1 max 05/24/2023 70.3   "cm/sec Final    RV V1 VTI 05/24/2023 15.2  cm Final    PA V2 max 05/24/2023 114.0  cm/sec Final    Ao root diam 05/24/2023 2.6  cm Final    ACS 05/24/2023 1.90  cm Final    Sinus 05/24/2023 2.9  cm Final      Adult Transthoracic Echo Complete W/ Cont if Necessary Per Protocol    Left ventricular ejection fraction appears to be 61 - 65%.    Left ventricular diastolic function is consistent with (grade I)   impaired relaxation.    [unfilled]  Immunization History   Administered Date(s) Administered    COVID-19 (MODERNA) 1st,2nd,3rd Dose Monovalent 05/13/2021, 06/10/2021    Fluzone >6mos 11/01/2019    Tetanus 01/01/2012    influenza Split 01/01/2015       The following portions of the patient's history were reviewed and updated as appropriate: allergies, current medications, past family history, past medical history, past social history, past surgical history and problem list.        Physical Exam  /68 (BP Location: Right arm, Patient Position: Sitting, Cuff Size: Adult)   Pulse 78   Temp 98.1 °F (36.7 °C)   Ht 167.6 cm (66\")   Wt 81.9 kg (180 lb 9.6 oz)   SpO2 98%   BMI 29.15 kg/m²     The ASCVD Risk score (Chaz BRITO, et al., 2019) failed to calculate for the following reasons:    The valid total cholesterol range is 130 to 320 mg/dL      Physical Exam  Vitals and nursing note reviewed.   Constitutional:       Appearance: He is well-developed. He is not diaphoretic.   HENT:      Head: Normocephalic and atraumatic.      Right Ear: External ear normal.   Eyes:      Conjunctiva/sclera: Conjunctivae normal.      Pupils: Pupils are equal, round, and reactive to light.   Cardiovascular:      Rate and Rhythm: Normal rate and regular rhythm.      Heart sounds: Normal heart sounds. No murmur heard.  Pulmonary:      Effort: Pulmonary effort is normal. No respiratory distress.      Breath sounds: Normal breath sounds.   Abdominal:      General: Bowel sounds are normal. There is no distension.      Palpations: " Abdomen is soft.      Tenderness: There is no abdominal tenderness.   Musculoskeletal:         General: Tenderness present. No deformity. Normal range of motion.      Cervical back: Normal range of motion and neck supple.   Skin:     General: Skin is warm.      Coloration: Skin is not pale.      Findings: No erythema or rash.   Neurological:      Mental Status: He is alert and oriented to person, place, and time.      Cranial Nerves: No cranial nerve deficit.   Psychiatric:         Behavior: Behavior normal.       [unfilled]   Diagnosis Plan   1. Encounter for screening for endocrine disorder  CBC Auto Differential    Comprehensive Metabolic Panel    Hemoglobin A1c    Lipid Panel    TSH      2. Essential hypertension        3. Mixed hyperlipidemia        4. Vitamin D deficiency        5. Prediabetes        6. Stage 2 chronic kidney disease        7. Family history of heart disease        8. Nonrheumatic tricuspid valve regurgitation        9. Nonrheumatic mitral valve regurgitation        10. Overweight        11. Diarrhea, unspecified type  Ambulatory Referral to Gastroenterology                -went over labwork    -recommend shingles vaccination   -next colonoscopy in 2026  -refer back to DR. 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    -family history of heart disease/right atrial enlargement/MR/TR  EKG today showed NSR with right atrial enlargement pt is concerned about his heart.  Reviewed echocardiogram at [facility]. Pt currently asymptomatic.  Denies any chest pain no dizziness no palpitations. Consider Cardiology referral. Pt currently asymptomatic. Pt will let me know if having any issues. Advised pt to to ER or call 911 if symptom severe   -right shoulder pain -Orthopedic following  -CKD stage 2 -continue to monitor      -ED - was on cialis   -overweight - counseled weight loss >5 minutes BMI at 29.15   -vitamin D deficiency - check vitamin D levels    -prediabetes prediabetes meal plan    -HTN - stop lisionpril and switch to losartan-HCTZ 50 12.5 mg daily. BP stable   -HLP -  Off lipitor 40 mg PO qhs. Recommend heart healthy diet   -advised pt to go to ER or call 911 if symptoms worrisome or severe  -advised pt to followup with specialist and referrals   -advised pt to be safe and call with questions and concerns   -advised pt to be safe during COVID-19 pandemic   I spent 30  minutes caring for Amos on this date of service. This time includes time spent by me in the following activities: preparing for the visit, reviewing tests, obtaining and/or reviewing a separately obtained history, performing a medically appropriate examination and/or evaluation, counseling and educating the patient/family/caregiver, ordering medications, tests, or procedures, referring and communicating with other health care professionals, documenting information in the medical record, independently interpreting results and communicating that information with the patient/family/caregiver and care coordination.   -recheck in 3-4 months         This document has been electronically signed by Manuel Andersen MD on September 7, 2023 15:39 CDT

## 2023-09-07 ENCOUNTER — OFFICE VISIT (OUTPATIENT)
Dept: FAMILY MEDICINE CLINIC | Facility: CLINIC | Age: 63
End: 2023-09-07
Payer: COMMERCIAL

## 2023-09-07 VITALS
BODY MASS INDEX: 29.02 KG/M2 | HEIGHT: 66 IN | SYSTOLIC BLOOD PRESSURE: 108 MMHG | TEMPERATURE: 98.1 F | OXYGEN SATURATION: 98 % | WEIGHT: 180.6 LBS | HEART RATE: 78 BPM | DIASTOLIC BLOOD PRESSURE: 68 MMHG

## 2023-09-07 DIAGNOSIS — E55.9 VITAMIN D DEFICIENCY: ICD-10-CM

## 2023-09-07 DIAGNOSIS — E66.3 OVERWEIGHT: ICD-10-CM

## 2023-09-07 DIAGNOSIS — Z82.49 FAMILY HISTORY OF HEART DISEASE: ICD-10-CM

## 2023-09-07 DIAGNOSIS — R19.7 DIARRHEA, UNSPECIFIED TYPE: ICD-10-CM

## 2023-09-07 DIAGNOSIS — Z13.29 ENCOUNTER FOR SCREENING FOR ENDOCRINE DISORDER: Primary | ICD-10-CM

## 2023-09-07 DIAGNOSIS — N18.2 STAGE 2 CHRONIC KIDNEY DISEASE: ICD-10-CM

## 2023-09-07 DIAGNOSIS — E78.2 MIXED HYPERLIPIDEMIA: ICD-10-CM

## 2023-09-07 DIAGNOSIS — I36.1 NONRHEUMATIC TRICUSPID VALVE REGURGITATION: ICD-10-CM

## 2023-09-07 DIAGNOSIS — I10 ESSENTIAL HYPERTENSION: ICD-10-CM

## 2023-09-07 DIAGNOSIS — R73.03 PREDIABETES: ICD-10-CM

## 2023-09-07 DIAGNOSIS — I34.0 NONRHEUMATIC MITRAL VALVE REGURGITATION: ICD-10-CM

## 2023-09-07 PROCEDURE — 99214 OFFICE O/P EST MOD 30 MIN: CPT | Performed by: FAMILY MEDICINE

## 2023-09-07 RX ORDER — LOSARTAN POTASSIUM AND HYDROCHLOROTHIAZIDE 12.5; 5 MG/1; MG/1
1 TABLET ORAL DAILY
Qty: 30 TABLET | Refills: 3 | Status: SHIPPED | OUTPATIENT
Start: 2023-09-07

## 2023-09-07 NOTE — PATIENT INSTRUCTIONS
Will refer to Dr. Quinn for your diarrhea     Possible sucrose isomaltose deficiency may be cause of diarrhea     Get labwork before next visit     Recheck in 3 months    Prediabetes meal plan    Go to ER or call 911 if symptoms worrisome or sever